# Patient Record
Sex: MALE | Race: BLACK OR AFRICAN AMERICAN | Employment: FULL TIME | URBAN - METROPOLITAN AREA
[De-identification: names, ages, dates, MRNs, and addresses within clinical notes are randomized per-mention and may not be internally consistent; named-entity substitution may affect disease eponyms.]

---

## 2017-01-04 ENCOUNTER — OP HISTORICAL/CONVERTED ENCOUNTER (OUTPATIENT)
Dept: OTHER | Age: 57
End: 2017-01-04

## 2017-02-01 ENCOUNTER — OP HISTORICAL/CONVERTED ENCOUNTER (OUTPATIENT)
Dept: OTHER | Age: 57
End: 2017-02-01

## 2017-03-01 ENCOUNTER — OP HISTORICAL/CONVERTED ENCOUNTER (OUTPATIENT)
Dept: OTHER | Age: 57
End: 2017-03-01

## 2017-04-18 ENCOUNTER — IP HISTORICAL/CONVERTED ENCOUNTER (OUTPATIENT)
Dept: OTHER | Age: 57
End: 2017-04-18

## 2017-07-30 ENCOUNTER — APPOINTMENT (OUTPATIENT)
Dept: GENERAL RADIOLOGY | Age: 57
End: 2017-07-30
Attending: EMERGENCY MEDICINE
Payer: OTHER GOVERNMENT

## 2017-07-30 ENCOUNTER — HOSPITAL ENCOUNTER (OUTPATIENT)
Age: 57
Setting detail: OBSERVATION
Discharge: HOME | End: 2017-08-01
Attending: EMERGENCY MEDICINE | Admitting: INTERNAL MEDICINE
Payer: OTHER GOVERNMENT

## 2017-07-30 DIAGNOSIS — I10 ESSENTIAL HYPERTENSION: ICD-10-CM

## 2017-07-30 DIAGNOSIS — R94.31 ABNORMAL EKG: ICD-10-CM

## 2017-07-30 DIAGNOSIS — I20.0 UNSTABLE ANGINA (HCC): Primary | ICD-10-CM

## 2017-07-30 PROBLEM — N18.30 CKD (CHRONIC KIDNEY DISEASE) STAGE 3, GFR 30-59 ML/MIN (HCC): Status: ACTIVE | Noted: 2017-07-30

## 2017-07-30 PROBLEM — F43.10 PTSD (POST-TRAUMATIC STRESS DISORDER): Status: ACTIVE | Noted: 2017-07-30

## 2017-07-30 PROBLEM — R07.9 CHEST PAIN: Status: ACTIVE | Noted: 2017-07-30

## 2017-07-30 PROBLEM — E87.6 HYPOKALEMIA: Status: ACTIVE | Noted: 2017-07-30

## 2017-07-30 PROBLEM — G47.33 OSA (OBSTRUCTIVE SLEEP APNEA): Status: ACTIVE | Noted: 2017-07-30

## 2017-07-30 LAB
ALBUMIN SERPL BCP-MCNC: 3.5 G/DL (ref 3.5–5)
ALBUMIN/GLOB SERPL: 1 {RATIO} (ref 1.1–2.2)
ALP SERPL-CCNC: 127 U/L (ref 45–117)
ALT SERPL-CCNC: 53 U/L (ref 12–78)
ANION GAP BLD CALC-SCNC: 7 MMOL/L (ref 5–15)
AST SERPL W P-5'-P-CCNC: 30 U/L (ref 15–37)
BASOPHILS # BLD AUTO: 0 K/UL (ref 0–0.1)
BASOPHILS # BLD: 0 % (ref 0–1)
BILIRUB SERPL-MCNC: 0.2 MG/DL (ref 0.2–1)
BUN SERPL-MCNC: 20 MG/DL (ref 6–20)
BUN/CREAT SERPL: 13 (ref 12–20)
CALCIUM SERPL-MCNC: 8.6 MG/DL (ref 8.5–10.1)
CHLORIDE SERPL-SCNC: 103 MMOL/L (ref 97–108)
CK MB CFR SERPL CALC: NORMAL % (ref 0–2.5)
CK MB SERPL-MCNC: <1 NG/ML (ref 5–25)
CK SERPL-CCNC: 103 U/L (ref 39–308)
CO2 SERPL-SCNC: 32 MMOL/L (ref 21–32)
CREAT SERPL-MCNC: 1.5 MG/DL (ref 0.7–1.3)
EOSINOPHIL # BLD: 0.1 K/UL (ref 0–0.4)
EOSINOPHIL NFR BLD: 2 % (ref 0–7)
ERYTHROCYTE [DISTWIDTH] IN BLOOD BY AUTOMATED COUNT: 13.2 % (ref 11.5–14.5)
GLOBULIN SER CALC-MCNC: 3.6 G/DL (ref 2–4)
GLUCOSE SERPL-MCNC: 113 MG/DL (ref 65–100)
HCT VFR BLD AUTO: 36.9 % (ref 36.6–50.3)
HGB BLD-MCNC: 12.7 G/DL (ref 12.1–17)
LYMPHOCYTES # BLD AUTO: 32 % (ref 12–49)
LYMPHOCYTES # BLD: 1.1 K/UL (ref 0.8–3.5)
MCH RBC QN AUTO: 29.7 PG (ref 26–34)
MCHC RBC AUTO-ENTMCNC: 34.4 G/DL (ref 30–36.5)
MCV RBC AUTO: 86.4 FL (ref 80–99)
MONOCYTES # BLD: 0.2 K/UL (ref 0–1)
MONOCYTES NFR BLD AUTO: 7 % (ref 5–13)
NEUTS SEG # BLD: 2 K/UL (ref 1.8–8)
NEUTS SEG NFR BLD AUTO: 59 % (ref 32–75)
PLATELET # BLD AUTO: 261 K/UL (ref 150–400)
POTASSIUM SERPL-SCNC: 3.1 MMOL/L (ref 3.5–5.1)
PROT SERPL-MCNC: 7.1 G/DL (ref 6.4–8.2)
RBC # BLD AUTO: 4.27 M/UL (ref 4.1–5.7)
SODIUM SERPL-SCNC: 142 MMOL/L (ref 136–145)
TROPONIN I SERPL-MCNC: <0.04 NG/ML
TROPONIN I SERPL-MCNC: <0.04 NG/ML
WBC # BLD AUTO: 3.4 K/UL (ref 4.1–11.1)

## 2017-07-30 PROCEDURE — 96374 THER/PROPH/DIAG INJ IV PUSH: CPT

## 2017-07-30 PROCEDURE — 84484 ASSAY OF TROPONIN QUANT: CPT | Performed by: EMERGENCY MEDICINE

## 2017-07-30 PROCEDURE — 93005 ELECTROCARDIOGRAM TRACING: CPT

## 2017-07-30 PROCEDURE — 71020 XR CHEST PA LAT: CPT

## 2017-07-30 PROCEDURE — 80053 COMPREHEN METABOLIC PANEL: CPT | Performed by: EMERGENCY MEDICINE

## 2017-07-30 PROCEDURE — 74011250637 HC RX REV CODE- 250/637: Performed by: INTERNAL MEDICINE

## 2017-07-30 PROCEDURE — 82553 CREATINE MB FRACTION: CPT | Performed by: INTERNAL MEDICINE

## 2017-07-30 PROCEDURE — 74011250637 HC RX REV CODE- 250/637: Performed by: EMERGENCY MEDICINE

## 2017-07-30 PROCEDURE — 36415 COLL VENOUS BLD VENIPUNCTURE: CPT | Performed by: INTERNAL MEDICINE

## 2017-07-30 PROCEDURE — 74011250636 HC RX REV CODE- 250/636: Performed by: INTERNAL MEDICINE

## 2017-07-30 PROCEDURE — 85025 COMPLETE CBC W/AUTO DIFF WBC: CPT | Performed by: EMERGENCY MEDICINE

## 2017-07-30 PROCEDURE — 99218 HC RM OBSERVATION: CPT

## 2017-07-30 PROCEDURE — 99285 EMERGENCY DEPT VISIT HI MDM: CPT

## 2017-07-30 PROCEDURE — 96372 THER/PROPH/DIAG INJ SC/IM: CPT

## 2017-07-30 RX ORDER — ASPIRIN 325 MG
325 TABLET ORAL DAILY
Status: DISCONTINUED | OUTPATIENT
Start: 2017-07-31 | End: 2017-07-31

## 2017-07-30 RX ORDER — TRAZODONE HYDROCHLORIDE 50 MG/1
50 TABLET ORAL
Status: DISCONTINUED | OUTPATIENT
Start: 2017-07-30 | End: 2017-08-01 | Stop reason: HOSPADM

## 2017-07-30 RX ORDER — LISINOPRIL AND HYDROCHLOROTHIAZIDE 12.5; 2 MG/1; MG/1
1 TABLET ORAL
COMMUNITY
End: 2017-08-01

## 2017-07-30 RX ORDER — CLONAZEPAM 0.5 MG/1
0.5 TABLET ORAL
Status: DISCONTINUED | OUTPATIENT
Start: 2017-07-30 | End: 2017-08-01 | Stop reason: HOSPADM

## 2017-07-30 RX ORDER — NITROGLYCERIN 0.4 MG/1
0.4 TABLET SUBLINGUAL
Status: COMPLETED | OUTPATIENT
Start: 2017-07-30 | End: 2017-07-30

## 2017-07-30 RX ORDER — METOPROLOL SUCCINATE 50 MG/1
50 TABLET, EXTENDED RELEASE ORAL DAILY
Status: DISCONTINUED | OUTPATIENT
Start: 2017-07-31 | End: 2017-07-31

## 2017-07-30 RX ORDER — AMLODIPINE BESYLATE 5 MG/1
5 TABLET ORAL DAILY
Status: DISCONTINUED | OUTPATIENT
Start: 2017-07-31 | End: 2017-08-01 | Stop reason: HOSPADM

## 2017-07-30 RX ORDER — POTASSIUM CHLORIDE 750 MG/1
40 TABLET, FILM COATED, EXTENDED RELEASE ORAL
Status: COMPLETED | OUTPATIENT
Start: 2017-07-30 | End: 2017-07-30

## 2017-07-30 RX ORDER — METOPROLOL SUCCINATE 50 MG/1
50 TABLET, EXTENDED RELEASE ORAL DAILY
Status: ON HOLD | COMMUNITY
End: 2017-08-01

## 2017-07-30 RX ORDER — CLONAZEPAM 2 MG/1
2 TABLET ORAL DAILY
COMMUNITY

## 2017-07-30 RX ORDER — FLUOXETINE HYDROCHLORIDE 40 MG/1
40 CAPSULE ORAL DAILY
COMMUNITY
End: 2019-01-14

## 2017-07-30 RX ORDER — TEMAZEPAM 15 MG/1
15 CAPSULE ORAL
Status: DISCONTINUED | OUTPATIENT
Start: 2017-07-30 | End: 2017-08-01 | Stop reason: HOSPADM

## 2017-07-30 RX ORDER — FLUOXETINE HYDROCHLORIDE 20 MG/1
40 CAPSULE ORAL DAILY
Status: DISCONTINUED | OUTPATIENT
Start: 2017-07-31 | End: 2017-08-01 | Stop reason: HOSPADM

## 2017-07-30 RX ORDER — SODIUM CHLORIDE 0.9 % (FLUSH) 0.9 %
5-10 SYRINGE (ML) INJECTION EVERY 8 HOURS
Status: DISCONTINUED | OUTPATIENT
Start: 2017-07-30 | End: 2017-08-01 | Stop reason: HOSPADM

## 2017-07-30 RX ORDER — MIRTAZAPINE 15 MG/1
15 TABLET, FILM COATED ORAL
Status: DISCONTINUED | OUTPATIENT
Start: 2017-07-30 | End: 2017-08-01 | Stop reason: HOSPADM

## 2017-07-30 RX ORDER — GUAIFENESIN 100 MG/5ML
324 LIQUID (ML) ORAL
Status: COMPLETED | OUTPATIENT
Start: 2017-07-30 | End: 2017-07-30

## 2017-07-30 RX ORDER — BUPROPION HYDROCHLORIDE 150 MG/1
150 TABLET, EXTENDED RELEASE ORAL 2 TIMES DAILY
Status: DISCONTINUED | OUTPATIENT
Start: 2017-07-30 | End: 2017-08-01 | Stop reason: HOSPADM

## 2017-07-30 RX ORDER — HEPARIN SODIUM 5000 [USP'U]/ML
5000 INJECTION, SOLUTION INTRAVENOUS; SUBCUTANEOUS EVERY 8 HOURS
Status: DISCONTINUED | OUTPATIENT
Start: 2017-07-30 | End: 2017-08-01 | Stop reason: HOSPADM

## 2017-07-30 RX ORDER — AMLODIPINE BESYLATE 10 MG/1
10 TABLET ORAL DAILY
COMMUNITY
End: 2017-07-30

## 2017-07-30 RX ORDER — CLONAZEPAM 1 MG/1
2 TABLET ORAL DAILY
Status: DISCONTINUED | OUTPATIENT
Start: 2017-07-31 | End: 2017-08-01 | Stop reason: HOSPADM

## 2017-07-30 RX ORDER — BUPROPION HYDROCHLORIDE 150 MG/1
100 TABLET, EXTENDED RELEASE ORAL DAILY
COMMUNITY

## 2017-07-30 RX ORDER — LISINOPRIL AND HYDROCHLOROTHIAZIDE 10; 12.5 MG/1; MG/1
1 TABLET ORAL
COMMUNITY
End: 2017-07-30

## 2017-07-30 RX ORDER — METOPROLOL SUCCINATE 100 MG/1
TABLET, EXTENDED RELEASE ORAL DAILY
COMMUNITY
End: 2017-07-30

## 2017-07-30 RX ORDER — SODIUM CHLORIDE 0.9 % (FLUSH) 0.9 %
5-10 SYRINGE (ML) INJECTION AS NEEDED
Status: DISCONTINUED | OUTPATIENT
Start: 2017-07-30 | End: 2017-08-01 | Stop reason: HOSPADM

## 2017-07-30 RX ORDER — ASPIRIN 325 MG
325 TABLET ORAL DAILY
Status: DISCONTINUED | OUTPATIENT
Start: 2017-07-31 | End: 2017-07-30

## 2017-07-30 RX ADMIN — Medication 10 ML: at 22:21

## 2017-07-30 RX ADMIN — CLONAZEPAM 0.5 MG: 0.5 TABLET ORAL at 22:21

## 2017-07-30 RX ADMIN — ASPIRIN 81 MG 324 MG: 81 TABLET ORAL at 14:29

## 2017-07-30 RX ADMIN — HEPARIN SODIUM 5000 UNITS: 5000 INJECTION, SOLUTION INTRAVENOUS; SUBCUTANEOUS at 18:21

## 2017-07-30 RX ADMIN — MIRTAZAPINE 15 MG: 15 TABLET, FILM COATED ORAL at 22:21

## 2017-07-30 RX ADMIN — NITROGLYCERIN 0.4 MG: 0.4 TABLET SUBLINGUAL at 14:34

## 2017-07-30 RX ADMIN — Medication 10 ML: at 18:21

## 2017-07-30 RX ADMIN — POTASSIUM CHLORIDE 40 MEQ: 750 TABLET, FILM COATED, EXTENDED RELEASE ORAL at 15:54

## 2017-07-30 RX ADMIN — TRAZODONE HYDROCHLORIDE 50 MG: 50 TABLET ORAL at 22:21

## 2017-07-30 RX ADMIN — BUPROPION HYDROCHLORIDE 150 MG: 150 TABLET, EXTENDED RELEASE ORAL at 18:21

## 2017-07-30 NOTE — ED NOTES
TRANSFER - OUT REPORT:    Verbal report given to Halie(name) on [employer]  being transferred to [facility](unit) for routine progression of care       Report consisted of patients Situation, Background, Assessment and   Recommendations(SBAR). Information from the following report(s) SBAR, ED Summary, MAR, Recent Results and Cardiac Rhythm NSR was reviewed with the receiving nurse. Lines:   Peripheral IV 07/30/17 Left Wrist (Active)   Site Assessment Clean, dry, & intact 7/30/2017  2:31 PM   Phlebitis Assessment 0 7/30/2017  2:31 PM   Infiltration Assessment 0 7/30/2017  2:31 PM   Dressing Status Clean, dry, & intact 7/30/2017  2:31 PM   Dressing Type Transparent 7/30/2017  2:31 PM   Hub Color/Line Status Pink;Patent; Flushed 7/30/2017  2:31 PM        Opportunity for questions and clarification was provided.       Patient transported with:   Monitor  Tech

## 2017-07-30 NOTE — IP AVS SNAPSHOT
Darren Dasilva 
 
 
 [address] [facility] 
[phone] Patient: Ar Garrett MRN: [id number] GRK:[id number] You are allergic to the following No active allergies Recent Documentation Height Weight BMI Smoking Status 1.676 m 74.8 kg 26.63 kg/m2 Never Smoker Emergency Contacts Name Discharge Info Relation Home Work Mobile [address] CAREGIVER [3] Spouse [3] [phone] About your hospitalization You were admitted on:  July 30, 2017 You last received care in the:  [facility] 3 PROG CARE TELE 2 You were discharged on:  August 1, 2017 Unit phone number:  [phone] Why you were hospitalized Your primary diagnosis was:  Not on File Your diagnoses also included:  Chest Pain, Ckd (Chronic Kidney Disease) Stage 3, Gfr 30-59 Ml/Min, Edgar (Obstructive Sleep Apnea), Htn (Hypertension), Prostate Cancer (Hcc), Ptsd (Post-Traumatic Stress Disorder), Hypokalemia Providers Seen During Your Hospitalizations Provider Role Specialty Primary office phone Judah Ortiz MD Attending Provider Emergency Medicine [phone] Toyin Rainey MD Attending Provider Hospitalist [phone] Jean Jeffrey MD Attending Provider Internal Medicine [phone] Damian Soto MD Attending Provider Internal Medicine [phone] Your Primary Care Physician (PCP) Primary Care Physician Office Phone Office Fax OTHER, PHYS ** None ** ** None ** Follow-up Information Follow up With Details Comments Contact Info Sarita Nieves MD In 1 week  [address] [facility] [place] [address] [facility] [address] 
[phone] Follow up with your primary care doctor in 1 week Phys MD Tammy   Patient can only remember the practice name and not the physician Current Discharge Medication List  
  
START taking these medications Dose & Instructions Dispensing Information Comments Morning Noon Evening Bedtime  
 amLODIPine 5 mg tablet Commonly known as:  Paige Grebe Your last dose was:  8/1 9:05 am  
Your next dose is:  8/2 9:00 am  
   
 Dose:  5 mg Take 1 Tab by mouth daily. Quantity:  30 Tab Refills:  0  
     
   
   
   
  
 aspirin 81 mg chewable tablet Your last dose was:  8/1  9:00 am  
Your next dose is:  8/2  9:05 am  
   
 Dose:  81 mg Take 1 Tab by mouth daily. Quantity:  30 Tab Refills:  0  
     
   
   
   
  
 atorvastatin 20 mg tablet Commonly known as:  LIPITOR Your last dose was:  7/31  9:30 pm  
Your next dose is:  8/1  9:30 pm  
   
 Dose:  20 mg Take 1 Tab by mouth nightly. Quantity:  30 Tab Refills:  0  
     
   
   
   
  
 cloNIDine HCl 0.1 mg tablet Commonly known as:  CATAPRES Notes to Patient:  AS NEEDED FOR HIGH BLOOD PRESSURE Dose:  0.1 mg Take 1 Tab by mouth as needed (as needed for BP>170/100). Quantity:  30 Tab Refills:  0  
     
   
   
   
  
 lisinopril 20 mg tablet Commonly known as:  Valene Pencil Your last dose was:  8/1  9:05 am  
Your next dose is:  8/2  9:00 am  
   
 Dose:  20 mg Take 1 Tab by mouth daily. Quantity:  30 Tab Refills:  0 CONTINUE these medications which have CHANGED Dose & Instructions Dispensing Information Comments Morning Noon Evening Bedtime  
 metoprolol succinate 100 mg tablet Commonly known as:  TOPROL-XL What changed:   
- medication strength 
- how much to take Your last dose was:  8/1  9:00 am  
Your next dose is:  8/2  9:00 am  
   
 Dose:  100 mg Take 1 Tab by mouth daily. Quantity:  30 Tab Refills:  0 CONTINUE these medications which have NOT CHANGED Dose & Instructions Dispensing Information Comments Morning Noon Evening Bedtime buPROPion  mg SR tablet Commonly known as: WELLBUTRIN SR Your last dose was:  8/1  9:00 am  
Your next dose is:  8/1  6:00 pm  
   
 Dose:  150 mg Take 150 mg by mouth two (2) times a day. Refills:  0  
     
   
   
   
  
 * clonazePAM 0.5 mg tablet Commonly known as:  Niki Flynna Your last dose was:  7/31  9:30 pm  
Your next dose is:  8/1  9:30 pm  
   
 Dose:  0.5 mg Take 0.5 mg by mouth nightly. Refills:  0  
     
   
   
   
  
 * clonazePAM 2 mg tablet Commonly known as:  Niki Flynna Your last dose was:  8/1 9:00 am  
Your next dose is:  8/2  9:00 am  
   
 Dose:  2 mg Take 2 mg by mouth daily. Refills:  0  
     
   
   
   
  
 mirtazapine 15 mg tablet Commonly known as:  Ryan Font Notes to Patient:  AS NEEDED Dose:  15 mg Take 15 mg by mouth nightly as needed. Refills:  0 PROzac 40 mg capsule Generic drug:  FLUoxetine Your last dose was:  8/1  9:05 am  
Your next dose is:  8/2  9:00 am  
   
 Dose:  40 mg Take 40 mg by mouth daily. Refills:  0  
     
   
   
   
  
 temazepam 15 mg capsule Commonly known as:  RESTORIL Notes to Patient:  AS NEEDED Dose:  15 mg Take 15 mg by mouth nightly as needed for Sleep. Refills:  0  
     
   
   
   
  
 traZODone 50 mg tablet Commonly known as:  Jessica Odor Your last dose was:  7/31  9:30 pm  
Your next dose is:  8/1  9:30 pm  
   
 Dose:  50 mg Take 50 mg by mouth nightly. Refills:  0  
     
   
   
   
  
 * Notice: This list has 2 medication(s) that are the same as other medications prescribed for you. Read the directions carefully, and ask your doctor or other care provider to review them with you. STOP taking these medications   
 lisinopril-hydroCHLOROthiazide 20-12.5 mg per tablet Commonly known as:  Weston Mendez Where to Get Your Medications Information on where to get these meds will be given to you by the nurse or doctor. !  Ask your nurse or doctor about these medications  
  amLODIPine 5 mg tablet  
 aspirin 81 mg chewable tablet  
 atorvastatin 20 mg tablet  
 cloNIDine HCl 0.1 mg tablet  
 lisinopril 20 mg tablet  
 metoprolol succinate 100 mg tablet Discharge Instructions HOSPITALIST DISCHARGE INSTRUCTIONS 
NAME: Griffin MARIN:  1960 MRN:  [id number] Date/Time:  2017 7:20 AM 
 
ADMIT DATE: 2017 DISCHARGE DATE: 2017 PRINCIPAL DISCHARGE DIAGNOSES: 
High blood pressure Chest pain MEDICATIONS: 
· It is important that you take the medication exactly as they are prescribed. Note the changes and additions to your medications. Be sure you understand these changes before you are discharged today. · Keep your medication in the bottles provided by the pharmacist and keep a list of the medication names, dosages, and times to be taken in your wallet. · Do not take other medications without consulting your doctor. Pain Management: per above medications What to do at [facility] Recommended diet:  Cardiac Diet Recommended activity: Activity as tolerated If you experience any of the following symptoms then please call your primary care physician or return to the emergency room if you cannot get hold of your doctor: 
severe chest pain, shortness of breath, dizziness, headache or other severe concerning symptoms that brought you to the hospital in the first place Follow Up: Follow-up Information Follow up With Details Comments Contact Info Brisa Harris MD In 1 week  [address] - [facility] Cardiology [facility] [address] 
[phone] Follow up with your primary care doctor in 1 week Information obtained by : 
I understand that if any problems occur once I am at home I am to contact my physician. I understand and acknowledge receipt of the instructions indicated above. Physician's or R.N.'s Signature                                                                  Date/Time Patient or Representative Signature                                                          Date/Time 
 
  
Acute High Blood Pressure: Care Instructions Your Care Instructions Acute high blood pressure is very high blood pressure. It's a serious problem. Very high blood pressure can damage your brain, heart, eyes, and kidneys. You may have been given medicines to lower your blood pressure. You may have gotten them as pills or through a needle in one of your veins. This is called an IV. And maybe you were given other medicines too. These can be needed when high blood pressure causes other problems. To keep your blood pressure at a lower level, you may need to make healthy lifestyle changes. And you will probably need to take medicines. Be sure to follow up with your doctor about your blood pressure and what you can do about it. Follow-up care is a key part of your treatment and safety. Be sure to make and go to all appointments, and call your doctor if you are having problems. It's also a good idea to know your test results and keep a list of the medicines you take. How can you care for yourself at home? · See your doctor as often as he or she recommends. This is to make sure your blood pressure is under control. You will probably go at least 2 times a year. But it may be more often at first. 
· Take your blood pressure medicine exactly as prescribed. You may take one or more types. They include diuretics, beta-blockers, ACE inhibitors, calcium channel blockers, and angiotensin II receptor blockers. Call your doctor if you think you are having a problem with your medicine.  
· If you take blood pressure medicine, talk to your doctor before you take decongestants or anti-inflammatory medicine, such as ibuprofen. These can raise blood pressure. · Learn how to check your blood pressure at home. Check it often. · Ask your doctor if it's okay to drink alcohol. · Talk to your doctor about lifestyle changes that can help blood pressure. These include being active and not smoking. When should you call for help? Call 911 anytime you think you may need emergency care. This may mean having symptoms that suggest that your blood pressure is causing a serious heart or blood vessel problem. Your blood pressure may be over 180/110. For example, call 911 if: 
· You have symptoms of a heart attack. These may include: ¨ Chest pain or pressure, or a strange feeling in the chest. 
¨ Sweating. ¨ Shortness of breath. ¨ Nausea or vomiting. ¨ Pain, pressure, or a strange feeling in the back, neck, jaw, or upper belly or in one or both shoulders or arms. ¨ Lightheadedness or sudden weakness. ¨ A fast or irregular heartbeat. · You have symptoms of a stroke. These may include: 
¨ Sudden numbness, tingling, weakness, or loss of movement in your face, arm, or leg, especially on only one side of your body. ¨ Sudden vision changes. ¨ Sudden trouble speaking. ¨ Sudden confusion or trouble understanding simple statements. ¨ Sudden problems with walking or balance. ¨ A sudden, severe headache that is different from past headaches. · You have severe back or belly pain. Do not wait until your blood pressure comes down on its own. Get help right away. Call your doctor now or seek immediate care if: 
· Your blood pressure is much higher than normal (such as 180/110 or higher), but you don't have symptoms. · You think high blood pressure is causing symptoms, such as: ¨ Severe headache. ¨ Blurry vision.  
Watch closely for changes in your health, and be sure to contact your doctor if: 
· Your blood pressure measures 140/90 or higher at least 2 times. That means the top number is 140 or higher or the bottom number is 90 or higher, or both. · You think you may be having side effects from your blood pressure medicine. · Your blood pressure is usually normal, but it goes above normal at least 2 times. Where can you learn more? Go to http://jailene-paula.info/. Enter O141 in the search box to learn more about \"Acute High Blood Pressure: Care Instructions. \" Current as of: August 8, 2016 Content Version: 11.3 © 1556-6823 [employer]. Care instructions adapted under license by [employer] (which disclaims liability or warranty for this information). If you have questions about a medical condition or this instruction, always ask your healthcare professional. [place] [address] any warranty or liability for your use of this information. Discharge Instructions Attachments/References MEFS - AMLODIPINE (HYPERTENIPINE-2.5, NORVASC) - (BY MOUTH) (ENGLISH) AMLODIPINE (BY MOUTH) (ENGLISH) ASPIRIN (BY MOUTH) (ENGLISH) ASPIRIN: HEART ATTACK AND STROKE PREVENTION (ENGLISH) MEFS - ATORVASTATIN (LIPITOR) - (BY MOUTH) (ENGLISH) ATORVASTATIN (BY MOUTH) (ENGLISH) MEFS - CLONIDINE (CATAPRES, KAPVAY, KAPVAY DOSE PACK) - (BY MOUTH) (ENGLISH) CLONIDINE (BY MOUTH) (ENGLISH) MEFS - LISINOPRIL (PRINIVIL, ZESTRIL) - (BY MOUTH) (ENGLISH) LISINOPRIL (BY MOUTH) (ENGLISH) Discharge Orders None [facility] Announcement We are excited to announce that we are making your provider's discharge notes available to you in Deemelo. You will see these notes when they are completed and signed by the physician that discharged you from your recent hospital stay. If you have any questions or concerns about any information you see in Deemelo, please call the Health Information Department where you were seen or reach out to your Primary Care Provider for more information about your plan of care. Introducing [facility]! [place] introduces QuVIS patient portal. Now you can access parts of your medical record, email your doctor's office, and request medication refills online. 1. In your internet browser, go to [email or web address] 2. Click on the First Time User? Click Here link in the Sign In box. You will see the New Member Sign Up page. 3. Enter your QuVIS Access Code exactly as it appears below. You will not need to use this code after youve completed the sign-up process. If you do not sign up before the expiration date, you must request a new code. · QuVIS Access Code: [email or web address] Expires: 10/28/2017  2:51 PM 
 
4. Enter the last four digits of your Social Security Number (xxxx) and Date of Birth (mm/dd/yyyy) as indicated and click Submit. You will be taken to the next sign-up page. 5. Create a QuVIS ID. This will be your QuVIS login ID and cannot be changed, so think of one that is secure and easy to remember. 6. Create a QuVIS password. You can change your password at any time. 7. Enter your Password Reset Question and Answer. This can be used at a later time if you forget your password. 8. Enter your e-mail address. You will receive e-mail notification when new information is available in [address] 9. Click Sign Up. You can now view and download portions of your medical record. 10. Click the Download Summary menu link to download a portable copy of your medical information. If you have questions, please visit the Frequently Asked Questions section of the QuVIS website. Remember, QuVIS is NOT to be used for urgent needs. For medical emergencies, dial 911. Now available from your iPhone and Android! General Information Please provide this summary of care documentation to your next provider. Patient Signature:  ____________________________________________________________  Date: ____________________________________________________________  
  
Celine Ramana Provider Signature:  ____________________________________________________________ Date:  ____________________________________________________________ More Information Amlodipine (Hypertenipine-2.5, Norvasc) - (By mouth) Why this medicine is used:  
Treats high blood pressure and angina (chest pain). Contact a nurse or doctor right away if you have: · Blistering, peeling, red skin rash · Chest pain that may spread to your arms, jaw, back, or neck · Trouble breathing · Swelling in your hands, ankles, legs · Unusual sweating, faintness Common side effects: · Tiredness, drowsiness · Headache © 2017 [facility] Information is for End User's use only and may not be sold, redistributed or otherwise used for commercial purposes. Amlodipine (By mouth) Amlodipine ([id number]) Treats high blood pressure and angina (chest pain). This medicine is a calcium channel blocker. Brand Name(s): Norvasc There may be other brand names for this medicine. When This Medicine Should Not Be Used: This medicine is not right for everyone. Do not use it if you had an allergic reaction to amlodipine. How to Use This Medicine:  
Tablet, Dissolving Tablet · Take your medicine as directed. Your dose may need to be changed several times to find what works best for you. Take this medicine at the same time each day. · Read and follow the patient instructions that come with this medicine. Talk to your doctor or pharmacist if you have any questions. · Missed dose: Take a dose as soon as you remember. If it has been more than 12 hours since you were supposed to take your dose, skip the missed dose and take your next regular dose at the regular time. · Store the medicine in a closed container at room temperature, away from heat, moisture, and direct light. Drugs and Foods to Avoid: Ask your doctor or pharmacist before using any other medicine, including over-the-counter medicines, vitamins, and herbal products. · Some medicines can affect how amlodipine works. Tell your doctor if you are also using any of the following: ¨ Clarithromycin, cyclosporine, diltiazem, itraconazole, ritonavir, sildenafil, simvastatin, tacrolimus Warnings While Using This Medicine: · Tell your doctor if you are pregnant or breastfeeding, or if you have liver disease, heart disease, coronary artery disease, or aortic stenosis. · This medicine could lower your blood pressure too much, especially when you first use it or if you are dehydrated. Stand or sit up slowly if you feel lightheaded or dizzy. · Your doctor will check your progress and the effects of this medicine at regular visits. Keep all appointments. · Do not stop using this medicine without asking your doctor, even if you feel well. This medicine will not cure high blood pressure, but it will help keep it in normal range. You may have to take blood pressure medicine for the rest of your life. · Keep all medicine out of the reach of children. Never share your medicine with anyone. Possible Side Effects While Using This Medicine:  
Call your doctor right away if you notice any of these side effects: · Allergic reaction: Itching or hives, swelling in your face or hands, swelling or tingling in your mouth or throat, chest tightness, trouble breathing · Lightheadedness, dizziness · New or worsening chest pain · Swelling in your hands, ankles, or legs · Trouble breathing, nausea, unusual sweating, fainting If you notice other side effects that you think are caused by this medicine, tell your doctor. Call your doctor for medical advice about side effects. You may report side effects to FDA at 0-431-FDA-4983 © 2017 [facility] Information is for End User's use only and may not be sold, redistributed or otherwise used for commercial purposes.  
The above information is an  only. It is not intended as medical advice for individual conditions or treatments. Talk to your doctor, nurse or pharmacist before following any medical regimen to see if it is safe and effective for you. Aspirin (By mouth) Aspirin (AS-pir-in) Treats pain, fever, and inflammation. May lower risk of heart attack and stroke. Brand Name(s): Ascriptin Regular Strength, Aspergum, Aspir Low, Aspirin Adult Low Dose, Jerome Aspirin Children's, Jerome Aspirin Regimen, Jerome Extra Strength, Jerome Genuine Aspirin, Bufferin, Bufferin Low Dose, Durlaza, Ecotrin, Ecpirin, Enteric Aspirin, Genuine Aspirin There may be other brand names for this medicine. When This Medicine Should Not Be Used: This medicine is not right for everyone. Do not use it if you had an allergic reaction to aspirin or other NSAIDs, or if you have a history of asthma with nasal polyps and rhinitis. How to Use This Medicine:  
Delayed Release Capsule, Long Acting Capsule, Gum, Tablet, Chewable Tablet, Fizzy Tablet, Coated Tablet, Long Acting Tablet, 24 Hour Capsule · Your doctor will tell you how much medicine to use. Do not use more than directed. · It is best to take this medicine with food or milk. · Capsule, tablet, or coated tablet: Swallow whole. Do not crush, break, or chew it. · Chewable tablet: You may chew it completely or swallow it whole. · Gum: Chew completely to make sure you get as much medicine as possible. Drink a full glass (8 ounces) of water after chewing the gum. · Swallow the extended-release capsule whole. Do not crush, break, or chew it. Take the capsule with a full glass of water at the same time each day. · Follow the instructions on the medicine label if you are using this medicine without a prescription. · Missed dose: If you miss a dose of Durlaza, skip the missed dose and go back to your regular dosing schedule.  Do not take extra medicine to make up for a missed dose. 
· Store the medicine in a closed container at room temperature, away from heat, moisture, and direct light. Drugs and Foods to Avoid: Ask your doctor or pharmacist before using any other medicine, including over-the-counter medicines, vitamins, and herbal products. · Some foods and medicines can affect how aspirin works. Tell your doctor if you are using any of the following: ¨ Dipyridamole, methotrexate, probenecid, sulfinpyrazone, ticlopidine ¨ Blood thinner (including clopidogrel, prasugrel, ticagrelor, warfarin) ¨ Blood pressure medicine ¨ Medicine to treat seizures (including phenytoin, valproic acid) ¨ NSAID pain or arthritis medicine (including celecoxib, diclofenac, ibuprofen, naproxen) ¨ Steroid medicine (including dexamethasone, hydrocortisone, methylprednisolone, prednisolone, prednisone) · Do not take Durlaza 2 hours before or 1 hour after you drink alcohol or take medicines that contain alcohol. Warnings While Using This Medicine: · Tell your doctor if you are pregnant or breastfeeding. Do not use this medicine during the later part of a pregnancy unless your doctor tells you to. · Tell your doctor if you have kidney disease, liver disease, high blood pressure, heart disease, or a history of stomach bleeding or ulcers. · This medicine may increase your risk for bleeding, including stomach ulcers. · Do not give aspirin to a child or teenager who has chickenpox or flu symptoms, unless the doctor says it is okay. Aspirin can cause a life-threatening reaction called Reye syndrome. · Tell any doctor or dentist who treats you that you are using this medicine. This medicine may affect certain medical test results. · Keep all medicine out of the reach of children. Never share your medicine with anyone. Possible Side Effects While Using This Medicine:  
Call your doctor right away if you notice any of these side effects: · Allergic reaction: Itching or hives, swelling in your face or hands, swelling or tingling in your mouth or throat, chest tightness, trouble breathing · Bloody or black stools, bloody vomit or vomit that looks like coffee grounds · Chest tightness, wheezing · Ringing in the ears · Severe stomach pain · Unusual bleeding, bruising, or weakness If you notice other side effects that you think are caused by this medicine, tell your doctor. Call your doctor for medical advice about side effects. You may report side effects to FDA at 3-682-CYB-8176 © 2017 [address] Information is for End User's use only and may not be sold, redistributed or otherwise used for commercial purposes. The above information is an  only. It is not intended as medical advice for individual conditions or treatments. Talk to your doctor, nurse or pharmacist before following any medical regimen to see if it is safe and effective for you. Taking Aspirin to Prevent Heart Attack and Stroke: Care Instructions Your Care Instructions Aspirin acts as a \"blood thinner. \" It prevents blood clots from forming. When taken during and after a heart attack, it can reduce your chance of dying. And it's used if you have a stent in your coronary artery. Also, aspirin helps certain people lower their risk of a heart attack or stroke. Be sure you know what dose of aspirin to take and how often to take it. Low-dose aspirin is typically 81 mg. But the dose for daily aspirin can range from 81 mg to 325 mg. Taking aspirin every day can cause bleeding. It may not be safe if you have stomach ulcers. And it may not be safe if you have high blood pressure that is not controlled. If you take aspirin pills every day, do not take ones that have other ingredients such as caffeine or sodium. Before you start to take aspirin, tell your doctor all the medicines, vitamins, herbal products, and supplements you take. Follow-up care is a key part of your treatment and safety.  Be sure to make and go to all appointments, and call your doctor if you are having problems. It's also a good idea to know your test results and keep a list of the medicines you take. How can you care for yourself at home? · Take aspirin with a full glass of water unless your doctor tells you not to. Do not lie down right after you take it. · If you have a stent in your coronary artery, take your aspirin as your heart doctor says to. If another doctor says to stop taking the aspirin for any reason, talk to your heart doctor before you stop. · Do not chew or crush the coated or sustained-release forms of aspirin. · Ask your doctor if you can drink alcohol while you take aspirin. And ask how much you can drink. Too much alcohol with aspirin can cause stomach bleeding. · Do not take aspirin if you are pregnant, unless your doctor says it is okay. · Keep all aspirin out of children's reach. · Throw aspirin away if it starts to smell like vinegar. · Do not take aspirin if you have gout or if you take prescription blood thinners, unless your doctor has told you to. · Do not take prescription or over-the-counter medicines, vitamins, herbal products, or supplements without talking to your doctor first. Virl Janet the label before you take another over-the-counter medicine. Many contain aspirin. So they could cause you to take too much aspirin. · Talk with your doctor before you take a pain medicine. Ask which type of medicine you can take and how to take it safely with aspirin. · Tell your doctor or dentist before a surgery or procedure that you take aspirin. He or she will tell you if you should stop taking aspirin before your surgery or procedure. Make sure that you understand exactly what your doctor wants you to do. Where can you learn more? Go to http://jailene-paula.info/. Enter U957 in the search box to learn more about \"Taking Aspirin to Prevent Heart Attack and Stroke: Care Instructions. \" Current as of: April 3, 2017 Content Version: 11.3 © 4200-9272 [employer]. Care instructions adapted under license by [employer] (which disclaims liability or warranty for this information). If you have questions about a medical condition or this instruction, always ask your healthcare professional. [address] any warranty or liability for your use of this information. Atorvastatin (Lipitor) - (By mouth) Why this medicine is used:  
Treats high cholesterol and triglyceride levels. Reduces the risk of angina, stroke, heart attack, or certain heart and blood vessel problems. Contact a nurse or doctor right away if you have: · Severe headache, confusion, trouble speaking · Dark urine or pale stools · Yellow skin or eyes · Nausea, vomiting, loss of appetite, stomach pain · Muscle pain, tenderness, or weakness; unusual tiredness Common side effects: 
· Diarrhea · Joint pain © 2017 [facility] Information is for End User's use only and may not be sold, redistributed or otherwise used for commercial purposes. Atorvastatin (By mouth) Atorvastatin (a-tor-va-STAT-in) Treats high cholesterol and triglyceride levels. Reduces the risk of angina, stroke, heart attack, or certain heart and blood vessel problems. This medicine is a statin. Brand Name(s): Lipitor There may be other brand names for this medicine. When This Medicine Should Not Be Used: This medicine is not right for everyone. Do not use it if you had an allergic reaction to atorvastatin, if you have active liver disease, or if you are pregnant or breastfeeding. How to Use This Medicine:  
Tablet · Take your medicine as directed. Your dose may need to be changed several times to find what works best for you. · Take this medicine at the same time each day. · Swallow the tablet whole. Do not break it. · Missed dose: Take a dose as soon as you remember.  If it is less than 12 hours until your next dose, skip the missed dose and take the next dose at the regular time. Do not take 2 doses of this medicine within 12 hours. · Read and follow the patient instructions that come with this medicine. Talk to your doctor or pharmacist if you have any questions. · Store the medicine in a closed container at room temperature, away from heat, moisture, and direct light. Drugs and Foods to Avoid: Ask your doctor or pharmacist before using any other medicine, including over-the-counter medicines, vitamins, and herbal products. · Some medicines can affect how atorvastatin works. Tell your doctor if you also use birth control pills, boceprevir, cimetidine, colchicine, cyclosporine, digoxin, niacin, rifampin, spironolactone, telaprevir, medicine to treat an infection, or medicine to treat HIV/AIDS. Warnings While Using This Medicine: · It is not safe to take this medicine during pregnancy. It could harm an unborn baby. Tell your doctor right away if you become pregnant. · Tell your doctor if you have kidney disease, diabetes, muscle pain or weakness, thyroid problems, have recently had a stroke or TIA (transient ischemic attack), or have a history of liver disease. Tell your doctor if you drink grapefruit juice or drink alcohol regularly. · This medicine can cause muscle problems, which can lead to kidney problems. · Tell any doctor or dentist who treats you that you use this medicine. You may need to stop using it if you have surgery, have an injury, or develop serious health problems. · Your doctor will do lab tests at regular visits to check on the effects of this medicine. Keep all appointments. · Keep all medicine out of the reach of children. Never share your medicine with anyone. Possible Side Effects While Using This Medicine:  
Call your doctor right away if you notice any of these side effects: · Allergic reaction: Itching or hives, swelling in your face or hands, swelling or tingling in your mouth or throat, chest tightness, trouble breathing · Blistering, peeling, red skin rash · Change in how much or how often you urinate · Dark urine or pale stools, nausea, vomiting, loss of appetite, stomach pain, yellow skin or eyes · Fever · Muscle pain, tenderness, or weakness · Unusual tiredness If you notice these less serious side effects, talk with your doctor: · Diarrhea · Joint pain If you notice other side effects that you think are caused by this medicine, tell your doctor. Call your doctor for medical advice about side effects. You may report side effects to FDA at 1-179-ACF-0572 © 2017 [facility] Information is for End User's use only and may not be sold, redistributed or otherwise used for commercial purposes. The above information is an  only. It is not intended as medical advice for individual conditions or treatments. Talk to your doctor, nurse or pharmacist before following any medical regimen to see if it is safe and effective for you. Clonidine (Catapres, Kapvay, Kapvay Dose Pack) - (By mouth) Why this medicine is used:  
Treats high blood pressure. Also treats ADHD. Contact a nurse or doctor right away if you have: 
· Fast, slow, pounding, or uneven heartbeat · Lightheadedness, dizziness, fainting Common side effects: · Tiredness · Constipation, stomach pain · Dry eyes, mouth, and throat · Headache © 2017 [facility] Information is for End User's use only and may not be sold, redistributed or otherwise used for commercial purposes. Clonidine (By mouth) Clonidine (Clay Rohini) Treats high blood pressure. Also treats ADHD. Brand Name(s): Alyson Neil There may be other brand names for this medicine. When This Medicine Should Not Be Used: This medicine is not right for everyone. Do not use it if you had an allergic reaction to clonidine.  
How to Use This Medicine:  
Long Acting Suspension, Tablet, Long Acting Tablet · Take your medicine as directed. Your dose may need to be changed several times to find what works best for you. · Swallow the extended-release tablet whole. Do not crush, break, or chew it. · Clonidine extended-release tablets work differently than clonidine immediate-release tablets. Do not switch from the extended-release tablets to the immediate-release tablets unless your doctor tells you to. · Measure the oral liquid medicine with a marked measuring spoon, oral syringe, or medicine cup. Shake the bottle well before each use. · Read and follow the patient instructions that come with this medicine. Talk to your doctor or pharmacist if you have any questions. · Missed dose: Take a dose as soon as you remember. If it is almost time for your next dose, wait until then and take a regular dose. Do not take extra medicine to make up for a missed dose. If you miss more than 1 dose of clonidine tablets, check with your doctor right away. · Store the medicine in a closed container at room temperature, away from heat, moisture, and direct light. Drugs and Foods to Avoid: Ask your doctor or pharmacist before using any other medicine, including over-the-counter medicines, vitamins, and herbal products. · Do not use this medicine together with other products that contain clonidine. · Some medicines can affect how clonidine works. Tell your doctor if you are taking depression medicine. · Tell your doctor if you use anything else that makes you sleepy. Some examples are allergy medicine, narcotic pain medicine, and alcohol. Warnings While Using This Medicine: · Tell your doctor if you are pregnant, breastfeeding, or have kidney disease, heart or blood vessel disease, heart rhythm problems, stomach or bowel problems, or a history of heart attack or stroke. · This medicine may make you drowsy, dizzy, or lightheaded.  Do not drive or do anything that could be dangerous until you know how this medicine affects you. · This medicine may cause dryness of the eyes. Talk to your doctor about how to treat the dryness. · Do not stop using this medicine suddenly. Your doctor will need to slowly decrease your dose before you stop it completely. · Tell any doctor or dentist who treats you that you are using this medicine. You may need to stop using this medicine several days before you have surgery or medical tests. · Even if you feel well, do not stop using the medicine without asking your doctor first. This medicine will not cure your high blood pressure, but it will help keep it in the normal range. You may have to take blood pressure medicine for the rest of your life. · Your doctor will check your progress and the effects of this medicine at regular visits. Keep all appointments. · Keep all medicine out of the reach of children. Never share your medicine with anyone. Possible Side Effects While Using This Medicine:  
Call your doctor right away if you notice any of these side effects: · Allergic reaction: Itching or hives, swelling in your face or hands, swelling or tingling in your mouth or throat, chest tightness, trouble breathing · Fast, slow, pounding, or uneven heartbeat · Lightheadedness, dizziness, fainting · Swelling in your hands, ankles, or feet · Unusual tiredness or weakness If you notice these less serious side effects, talk with your doctor: · Constipation, stomach pain · Dry eyes, mouth, or throat · Mild skin rash · New or worsening headache If you notice other side effects that you think are caused by this medicine, tell your doctor. Call your doctor for medical advice about side effects. You may report side effects to FDA at 9-896-FDA-2959 © 2017 [address] Information is for End User's use only and may not be sold, redistributed or otherwise used for commercial purposes.  
The above information is an educational aid only. It is not intended as medical advice for individual conditions or treatments. Talk to your doctor, nurse or pharmacist before following any medical regimen to see if it is safe and effective for you. Lisinopril (Prinivil, Zestril) - (By mouth) Why this medicine is used:  
Treats high blood pressure and heart failure. Contact a nurse or doctor right away if you have: · Blistering, peeling, red skin rash · Change in how much or how often you urinate · Confusion, weakness, uneven heartbeat, trouble breathing · Lightheadedness, dizziness, fainting · Numbness or tingling in your hands, feet, or lips Common side effects: · Dry cough © 2017 [facility] Information is for End User's use only and may not be sold, redistributed or otherwise used for commercial purposes. Lisinopril (By mouth) Lisinopril (lye-SIN-oh-pril) Treats high blood pressure and heart failure. Also given to reduce the risk of death after a heart attack. This medicine is an ACE inhibitor. Brand Name(s): Prinivil, Qbrelis, Zestril There may be other brand names for this medicine. When This Medicine Should Not Be Used: This medicine is not right for everyone. Do not use it if you had an allergic reaction to lisinopril or another ACE inhibitor, or if you are pregnant. How to Use This Medicine:  
Liquid, Tablet · Take your medicine as directed. Your dose may need to be changed several times to find what works best for you. · Oral liquid: Measure the oral liquid medicine with a marked measuring spoon, oral syringe, or medicine cup. · Missed dose: Take a dose as soon as you remember. If it is almost time for your next dose, wait until then and take a regular dose. Do not take extra medicine to make up for a missed dose. · Store the medicine in a closed container at room temperature, away from heat, moisture, and direct light. Drugs and Foods to Avoid: Ask your doctor or pharmacist before using any other medicine, including over-the-counter medicines, vitamins, and herbal products. · Do not use this medicine together with aliskiren if you have diabetes. · Some foods and medicines may affect how lisinopril works. Tell your doctor if you are using any of the following: ¨ Aliskiren, everolimus, lithium, sirolimus, temsirolimus ¨ Another blood pressure medicine, including an angiotensin receptor blocker (ARB) ¨ Diuretic (water pill, including amiloride, spironolactone, triamterene) ¨ Insulin or diabetes medicine ¨ NSAID pain or arthritis medicine (including aspirin, celecoxib, diclofenac, ibuprofen, naproxen) · Ask your doctor before you use any medicine, supplement, or salt substitute that contains potassium. Warnings While Using This Medicine: · It is not safe to take this medicine during pregnancy. It could harm an unborn baby. Tell your doctor right away if you become pregnant. · Tell your doctor if you are breastfeeding, or if you have kidney disease, liver disease, diabetes, or heart or blood vessel disease. · This medicine may cause the following problems: ¨ Angioedema (severe swelling) ¨ Kidney problems ¨ Serious liver problems · This medicine could lower your blood pressure too much, especially when you first use it or if you are dehydrated. Stand or sit up slowly if you feel lightheaded or dizzy. · Do not stop using this medicine without asking your doctor, even if you feel well. This medicine will not cure your high blood pressure, but it will help keep it in a normal range. You may have to take blood pressure medicine for the rest of your life. · Tell any doctor or dentist who treats you that you are using this medicine. · Your doctor will do lab tests at regular visits to check on the effects of this medicine. Keep all appointments. · Keep all medicine out of the reach of children. Never share your medicine with anyone.  
Possible Side Effects While Using This Medicine:  
Call your doctor right away if you notice any of these side effects: · Allergic reaction: Itching or hives, swelling in your face or hands, swelling or tingling in your mouth or throat, chest tightness, trouble breathing · Blistering, peeling, or red skin rash · Change in how much or how often you urinate · Confusion, weakness, uneven heartbeat, trouble breathing, numbness or tingling in your hands, feet, or lips · Dark urine or pale stools, nausea, vomiting, loss of appetite, stomach pain, yellow skin or eyes · Fever, chills, sore throat, body aches · Lightheadedness, dizziness, fainting · Severe stomach pain (with or without nausea or vomiting) If you notice these less serious side effects, talk with your doctor: · Dry cough If you notice other side effects that you think are caused by this medicine, tell your doctor. Call your doctor for medical advice about side effects. You may report side effects to FDA at 5-542-FDA-3214 © 2017 [address]  Information is for End User's use only and may not be sold, redistributed or otherwise used for commercial purposes. The above information is an  only. It is not intended as medical advice for individual conditions or treatments. Talk to your doctor, nurse or pharmacist before following any medical regimen to see if it is safe and effective for you.

## 2017-07-30 NOTE — ED PROVIDER NOTES
HPI Comments: 59 y.o. male with past medical history significant for PTSD, PSA elevation, HTN, prostate CA, sleep apnea and stroke who presents from home with chief complaint of chest pain. Per pt, he experienced onset of mid-sternal aching pain this morning upon waking around 0730 without radiation. He notes having a hx of HTN so he check his BP following noticing his CP. Upon evaluation, the pt reports that his BP read 191/120. While in the ED, he makes it known that his BP has appeared to be in 117'G-624'G systolic over the past month. Per pt, his CP has appeared worse with bending yet does not appear worsened with exertion. He rates his pain 6/10. The pt reports that he is currently prescribed Metoprolol as well as amlodipine which he has been taking as prescribed (metoprolol in the morning and amlodipine in the evening). Per pt, he took his Metoprolol this morning PTA. The pt reports that he has past hx of a stroke and was evaluated at [facility] where he was informed that stroke was likely related to HTN. He notes that he was instructed to take 81 mg ASA daily however he has not been taking it over the past month. When asked when he last obtained a stress test, the pt reports that it occurred sometime in April. He denies fever, N/V, diaphoresis, SOB and headaches. There are no other acute medical concerns at this time. Social hx: Non-smoker, No ETOH use  PCP: Brittney Salgado. MOISES Freitas    Note written by Kesha Watts, as dictated by Bandar Boswell MD 2:12 PM        The history is provided by the patient and the spouse.         Past Medical History:   Diagnosis Date    Cancer [facility]) 2016    prostate    Hypertension     PSA elevation     PTSD (post-traumatic stress disorder)     Sleep apnea     not compliant with CPAP    Stroke [facility])     April 2017       Past Surgical History:   Procedure Laterality Date    [place] PROSTATE BIOPSY, NEEDLE, SATURATION SAMPLING      x 2         Family History: Problem Relation Age of Onset    Heart Disease Mother     Diabetes Mother     Hypertension Mother     Cancer Mother     Substance Abuse Sister     Psychiatric Disorder Sister     Heart Disease Sister     Substance Abuse Sister        Social History     Social History    Marital status:      Spouse name: N/A    Number of children: N/A    Years of education: N/A     Occupational History    Not on file. Social History Main Topics    Smoking status: Never Smoker    Smokeless tobacco: Never Used    Alcohol use No    Drug use: No    Sexual activity: Not on file     Other Topics Concern    Not on file     Social History Narrative         ALLERGIES: Review of patient's allergies indicates no known allergies. Review of Systems   Constitutional: Negative. Negative for diaphoresis and fever. HENT: Negative. Eyes: Negative. Respiratory: Negative. Negative for shortness of breath. Cardiovascular: Positive for chest pain ( Onset this morning (7/30/2017) around 0730 upon waking. Pain located mid-sternally and is discribed as an aching pain  that has decreased yet remains present at this time. ). Gastrointestinal: Negative. Negative for nausea and vomiting. Endocrine: Negative. Genitourinary: Negative. Musculoskeletal: Negative. Skin: Negative. Allergic/Immunologic: Negative. Neurological: Negative. Hematological: Negative. Psychiatric/Behavioral: Negative. All other systems reviewed and are negative. Vitals:    07/30/17 1350 07/30/17 1407   BP: (!) 158/92    Pulse: 77    Resp: 16    Temp: 98.2 °F (36.8 °C)    SpO2: 97% 98%   Weight: 80.7 kg (178 lb)    Height: 5' 6\" (1.676 m)             Physical Exam   Constitutional: He is oriented to person, place, and time. He appears well-developed and well-nourished. No distress. HENT:   Head: Normocephalic and atraumatic. Eyes: Conjunctivae and EOM are normal. Pupils are equal, round, and reactive to light. Neck: Normal range of motion. Neck supple. Cardiovascular: Normal rate, regular rhythm, normal heart sounds and intact distal pulses. Exam reveals no friction rub. No murmur heard. Pulmonary/Chest: Effort normal and breath sounds normal. No respiratory distress. He has no wheezes. He has no rales. He exhibits tenderness. Abdominal: Soft. Bowel sounds are normal. He exhibits no distension. There is no tenderness. There is no rebound and no guarding. Musculoskeletal: Normal range of motion. He exhibits no edema. Neurological: He is alert and oriented to person, place, and time. Coordination normal.   Skin: Skin is warm and dry. He is not diaphoretic. No pallor. Psychiatric: He has a normal mood and affect. His behavior is normal.   Nursing note and vitals reviewed. MDM  Number of Diagnoses or Management Options  Abnormal EKG:   Essential hypertension:   Unstable angina [facility]):   Diagnosis management comments: Cp- abnl ekg but unchanged try nitro. Concern for angina given abnl ekg, bp and risk factors though does seem somewhat worse with movement and may be muscular       Amount and/or Complexity of Data Reviewed  Clinical lab tests: ordered and reviewed  Tests in the radiology section of CPT®: ordered and reviewed  Obtain history from someone other than the patient: yes (wife)  Discuss the patient with other providers: yes (hospitalist)  Independent visualization of images, tracings, or specimens: yes (ekg)    Patient Progress  Patient progress: stable    ED Course       Procedures      EKG interpretation: (Preliminary)  Rhythm: normal sinus rhythm; and regular .  Rate (approx.): 75; Axis: normal; P wave: normal; QRS interval: normal ; ST/T wave: T wave inverted; in  Lead: lateral leads unchanged from 8/15/16       2:58 PM  Pt reports pain gone after 1 nitroglycerin    4:14 PM  Spoke with dr Ailyn Quintana who will admit

## 2017-07-30 NOTE — Clinical Note
Patient Class[de-identified] Observation [354] Type of Bed: Telemetry [19] Reason for Observation: chest pain Admitting Diagnosis: Chest pain [033602] Admitting Physician: Laura Guzman Attending Physician: Laura Guzman

## 2017-07-30 NOTE — PROGRESS NOTES
TRANSFER - IN REPORT:    Verbal report received from [facility] Grace Tamez RN(name) on [employer]  being received from ED(unit) for routine progression of care      Report consisted of patients Situation, Background, Assessment and   Recommendations(SBAR). Information from the following report(s) SBAR, Kardex and Intake/Output was reviewed with the receiving nurse. Opportunity for questions and clarification was provided. Assessment completed upon patients arrival to unit and care assumed. Admission Assessment done. .Vss. David CP. .RA. Lars Sams Monitor- NSR. Lars Sams Bp150's. .Watching TV. Family @ bedside. .. Primary Nurse Jarek Montalvo, RN and Trent Lares RN, RN performed a dual skin assessment on this patient No impairment noted  Chris score is 23. .    1900-Bedside and Verbal shift change report given to [address] [place] [address] (oncoming nurse) by Quiana Cha (offgoing nurse). Report included the following information SBAR, Kardex and Recent Results.

## 2017-07-30 NOTE — H&P
[address]  [phone]    Admission History and Physical      NAME:  Ju Parra   :   1960   MRN:  [id number]     PCP:  Kalyani Munoz MD     Date/Time:  2017         Subjective:     CHIEF COMPLAINT: chest pain, elevated BP     HISTORY OF PRESENT ILLNESS:     Mr. Teri Owens is a 59 y.o.  male who is admitted with chest pain. Mr. Teri Owens with PMH of HTN, PTSD, MELLY, CVA presented to ER c/o chest pain, which woke him up from his sleep this morning. The chest pain is retrosternal, moderate in intensity without radiation. It is associated with SOB, but no nausea, vomiting or diaphoresis. Chest pain is continues until resolved after NTG. Exertion makes the pain worse. According to pt he has a cardiologist, Dr Nusrat Mon who did stress test in May of this year. Past Medical History:   Diagnosis Date    Cancer [facility])     prostate    Hypertension     PSA elevation     PTSD (post-traumatic stress disorder)     Sleep apnea     not compliant with CPAP    Stroke [facility])     2017        Past Surgical History:   Procedure Laterality Date    [place] PROSTATE BIOPSY, NEEDLE, SATURATION SAMPLING      x 2       Social History   Substance Use Topics    Smoking status: Never Smoker    Smokeless tobacco: Never Used    Alcohol use No        Family History   Problem Relation Age of Onset    Heart Disease Mother     Diabetes Mother     Hypertension Mother    [facility] Cancer Mother     Substance Abuse Sister     Psychiatric Disorder Sister     Heart Disease Sister     Substance Abuse Sister         No Known Allergies     Prior to Admission medications    Medication Sig Start Date End Date Taking? Authorizing Provider   amLODIPine (NORVASC) 10 mg tablet Take 10 mg by mouth daily. Yes Kalyani Munoz MD   metoprolol succinate (TOPROL-XL) 100 mg tablet Take  by mouth daily.    Yes Kalyani Munoz MD   hydrochlorothiazide (HYDRODIURIL) 25 mg tablet Take 25 mg by mouth daily. Yes Historical Provider   mirtazapine (REMERON) 15 mg tablet Take 15 mg by mouth nightly. Yes Historical Provider   temazepam (RESTORIL) 15 mg capsule Take 15 mg by mouth nightly as needed for Sleep. Yes Historical Provider   traZODone (DESYREL) 50 mg tablet Take 50 mg by mouth nightly as needed for Sleep. Yes Historical Provider   clonazePAM (KLONOPIN) 0.5 mg tablet Take 0.5 mg by mouth nightly as needed. Yes Phys Other, MD   FLUoxetine (PROZAC) 20 mg capsule Take  by mouth daily. Yes Phys Other, MD   simvastatin (ZOCOR) 10 mg tablet Take 10 mg by mouth nightly. Yes Phys Other, MD   docusate sodium (COLACE) 100 mg capsule Take 1 Cap by mouth two (2) times a day. 8/23/16   Kenia Wall MD   oxybutynin (DITROPAN) 5 mg tablet Take 1 Tab by mouth every eight (8) hours as needed for Other (Bladder Spasm). 8/23/16   Kenia Wall MD   oxyCODONE IR (ROXICODONE) 5 mg immediate release tablet Take 1 Tab by mouth every four (4) hours as needed. Max Daily Amount: 30 mg. As needed for pain 8/23/16   Kenia Wall MD   trimethoprim-sulfamethoxazole (BACTRIM DS, SEPTRA DS) 160-800 mg per tablet Take 1 Tab by mouth two (2) times a day. Start 1 day prior to catheter removal. 8/23/16   Kenia Wall MD   OTHER as needed for Other (Uses inhaler when needed for chronic bronchitis).  Indications: Pt. not sure of name of inhaler    Historical Provider         Review of Systems:  (bold if positive, if negative)    Gen:  Eyes:  ENT:  CVS:   chest painPulm:  GI:    :    MS:  Skin:  Psych:  Endo:    Hem:  Renal:    Neuro:            Objective:      VITALS:    Vital signs reviewed; most recent are:    Visit Vitals    /83    Pulse 67    Temp 98.2 °F (36.8 °C)    Resp 16    Ht 5' 6\" (1.676 m)    Wt 80.7 kg (178 lb)    SpO2 94%    BMI 28.73 kg/m2     SpO2 Readings from Last 6 Encounters:   07/30/17 94%   08/24/16 97%   08/15/16 98%   02/20/16 96%        No intake or output data in the 24 hours ending 07/30/17 9516         Exam:     Physical Exam:    Gen:  Well-developed, well-nourished, in no acute distress  HEENT:  Pink conjunctivae, PERRL, hearing intact to voice, moist mucous membranes  Neck:  Supple, without masses, thyroid non-tender  Resp:  No accessory muscle use, clear breath sounds without wheezes rales or rhonchi  Card:  No murmurs, normal S1, S2 without thrills, bruits or peripheral edema  Abd:  Soft, non-tender, non-distended, normoactive bowel sounds are present, no palpable organomegaly and no detectable hernias  Lymph:  No cervical or inguinal adenopathy  Musc:  No cyanosis or clubbing  Skin:  No rashes or ulcers, skin turgor is good  Neuro:  Cranial nerves are grossly intact, no focal motor weakness, follows commands appropriately  Psych:  Good insight, oriented to person, place and time, alert       Labs:    Recent Labs      07/30/17   1433   WBC  3.4*   HGB  12.7   HCT  36.9   PLT  261     Recent Labs      07/30/17   1433   NA  142   K  3.1*   CL  103   CO2  32   GLU  113*   BUN  20   CREA  1.50*   CA  8.6   ALB  3.5   TBILI  0.2   SGOT  30   ALT  53     Lab Results   Component Value Date/Time    Glucose (POC) 97 08/22/2016 12:05 PM     No results for input(s): PH, PCO2, PO2, HCO3, FIO2 in the last 72 hours. No results for input(s): INR in the last 72 hours. No lab exists for component: INREXT    Telemetry reviewed:   normal sinus rhythm       Assessment/Plan:    1. Chest pain (7/30/2017). Admit to telemetry. EKG is nonischemic. First troponin is negative. Check searial cardiac enzymes, echocardiogram. ASA. Consult cardiology. 2.  HTN (hypertension) (7/30/2017). On admission BP was high which later improved after he was given his meds. Continue home metoprolol. Will hold lisinopril and HCTZ. Added amlodipine. 3.  Hypokalemia. Replete. 4.  Prostate cancer ([address] (8/22/2016) s/p post prostatectomy. Follows with Dr Miguel Townsend.      5. Acute on CKD (chronic kidney disease) stage 3, GFR 30-59 ml/min (7/30/2017). Continue to monitor. May need outpatient nephrology evaluation. Hold HCTZ and lisinopril. 6.  PTSD (post-traumatic stress disorder) (7/30/2017). Continue home meds. 7.  MELLY (obstructive sleep apnea) (7/30/2017). Not using CPAP.              Previous medical records reviewed     Risk of deterioration: high      Total time spent with patient: 79 [address] [facility] discussed with: Patient, Family, Nursing Staff and >50% of time spent in counseling and coordination of care    Discussed:  Care Plan    Prophylaxis:  Hep SQ    Probable Disposition:  Home w/Family           ___________________________________________________    Attending Physician: Rajat Estrada MD

## 2017-07-30 NOTE — IP AVS SNAPSHOT
[address] 
[phone] Patient: Annabel Hannah MRN: [id number] Current Discharge Medication List  
  
START taking these medications Dose & Instructions Dispensing Information Comments Morning Noon Evening Bedtime  
 amLODIPine 5 mg tablet Commonly known as:  Murali Franco Your last dose was:  8/1 9:05 am  
Your next dose is:  8/2 9:00 am  
   
 Dose:  5 mg Take 1 Tab by mouth daily. Quantity:  30 Tab Refills:  0  
     
   
   
   
  
 aspirin 81 mg chewable tablet Your last dose was:  8/1  9:00 am  
Your next dose is:  8/2  9:05 am  
   
 Dose:  81 mg Take 1 Tab by mouth daily. Quantity:  30 Tab Refills:  0  
     
   
   
   
  
 atorvastatin 20 mg tablet Commonly known as:  LIPITOR Your last dose was:  7/31  9:30 pm  
Your next dose is:  8/1  9:30 pm  
   
 Dose:  20 mg Take 1 Tab by mouth nightly. Quantity:  30 Tab Refills:  0  
     
   
   
   
  
 cloNIDine HCl 0.1 mg tablet Commonly known as:  CATAPRES Notes to Patient:  AS NEEDED FOR HIGH BLOOD PRESSURE Dose:  0.1 mg Take 1 Tab by mouth as needed (as needed for BP>170/100). Quantity:  30 Tab Refills:  0  
     
   
   
   
  
 lisinopril 20 mg tablet Commonly known as:  Ramiro Ireland Your last dose was:  8/1  9:05 am  
Your next dose is:  8/2  9:00 am  
   
 Dose:  20 mg Take 1 Tab by mouth daily. Quantity:  30 Tab Refills:  0 CONTINUE these medications which have CHANGED Dose & Instructions Dispensing Information Comments Morning Noon Evening Bedtime  
 metoprolol succinate 100 mg tablet Commonly known as:  TOPROL-XL What changed:   
- medication strength 
- how much to take Your last dose was:  8/1  9:00 am  
Your next dose is:  8/2  9:00 am  
   
 Dose:  100 mg Take 1 Tab by mouth daily. Quantity:  30 Tab Refills:  0 CONTINUE these medications which have NOT CHANGED Dose & Instructions Dispensing Information Comments Morning Noon Evening Bedtime buPROPion  mg SR tablet Commonly known as:  Malu Monroy Your last dose was:  8/1  9:00 am  
Your next dose is:  8/1  6:00 pm  
   
 Dose:  150 mg Take 150 mg by mouth two (2) times a day. Refills:  0  
     
   
   
   
  
 * clonazePAM 0.5 mg tablet Commonly known as:  Brijesh Primer Your last dose was:  7/31  9:30 pm  
Your next dose is:  8/1  9:30 pm  
   
 Dose:  0.5 mg Take 0.5 mg by mouth nightly. Refills:  0  
     
   
   
   
  
 * clonazePAM 2 mg tablet Commonly known as:  Brijesh Primer Your last dose was:  8/1 9:00 am  
Your next dose is:  8/2  9:00 am  
   
 Dose:  2 mg Take 2 mg by mouth daily. Refills:  0  
     
   
   
   
  
 mirtazapine 15 mg tablet Commonly known as:  Brigid Esparza Notes to Patient:  AS NEEDED Dose:  15 mg Take 15 mg by mouth nightly as needed. Refills:  0 PROzac 40 mg capsule Generic drug:  FLUoxetine Your last dose was:  8/1  9:05 am  
Your next dose is:  8/2  9:00 am  
   
 Dose:  40 mg Take 40 mg by mouth daily. Refills:  0  
     
   
   
   
  
 temazepam 15 mg capsule Commonly known as:  RESTORIL Notes to Patient:  AS NEEDED Dose:  15 mg Take 15 mg by mouth nightly as needed for Sleep. Refills:  0  
     
   
   
   
  
 traZODone 50 mg tablet Commonly known as:  Som Grills Your last dose was:  7/31  9:30 pm  
Your next dose is:  8/1  9:30 pm  
   
 Dose:  50 mg Take 50 mg by mouth nightly. Refills:  0  
     
   
   
   
  
 * Notice: This list has 2 medication(s) that are the same as other medications prescribed for you. Read the directions carefully, and ask your doctor or other care provider to review them with you. STOP taking these medications   
 lisinopril-hydroCHLOROthiazide 20-12.5 mg per tablet Commonly known as: Juan Elise Where to Get Your Medications Information on where to get these meds will be given to you by the nurse or doctor. ! Ask your nurse or doctor about these medications  
  amLODIPine 5 mg tablet  
 aspirin 81 mg chewable tablet  
 atorvastatin 20 mg tablet  
 cloNIDine HCl 0.1 mg tablet  
 lisinopril 20 mg tablet  
 metoprolol succinate 100 mg tablet

## 2017-07-30 NOTE — PROGRESS NOTES
[facility]: MED RECONCILIATION    Comments/Recommendations:   · Patient is suppose to take ASA 81 mg daily and Vit D 5000 units daily, but stopped about a month ago due to NOT wanting to pay for supply over the counter. · Patient states he has not taken any \"statins\" due to no supply (most recent filled Simvastatin 20 mg on 5/16/2017; Atorvastatin 10 mg on 2/2017). · Some of the dosages given by the patient does not line up with recently filled medications (reliability of information?)  · Patient states he is very compliant with taking him medications daily (using a weekly pill container). Medication(s) ADDED to PTA list:  1. Lisinopril/HCTZ 20/12.5 mg  2. Bupropion  mg BID  3. Clonazepam 2 mg daily    Medication(s) REMOVED from PTA list:  1. Amlodipine 10 mg daily  2. Docusate 100 mg BID  3. HCTZ 25 mg daily  4. Oxybutynin 5 mg Q 8 hrs prn  5. Oxycodone 5 mg prn  6. [id number] mg QHS  7. Bactrim DS    Medication(s) ADJUSTED on PTA list:  1. Clonazepam 0.5 mg changed to schedule HS (from prn)  2. Trazodone changed to scheduled HS (from prn)- patient states he used 50 mg, though most recent fill is for 100 mg.  3. Mirtazapine changed to \"PRN\" from scheduled    Information obtained from: Rx Query/ Patient    Allergies: Review of patient's allergies indicates no known allergies. Prior to Admission Medications:     Medication Documentation Review Audit      Prior to Admission Medications   Prescriptions Last Dose Informant Patient Reported? Taking? FLUoxetine (PROZAC) 40 mg capsule 7/30/2017 at am  Yes Yes   Sig: Take 40 mg by mouth daily. buPROPion SR (WELLBUTRIN SR) 150 mg SR tablet 7/30/2017 at am  Yes Yes   Sig: Take 150 mg by mouth two (2) times a day. clonazePAM (KLONOPIN) 0.5 mg tablet 7/29/2017 at PM  Yes Yes   Sig: Take 0.5 mg by mouth nightly. clonazePAM (KLONOPIN) 2 mg tablet 7/30/2017 at am  Yes Yes   Sig: Take 2 mg by mouth daily.    lisinopril-hydroCHLOROthiazide (Juan Elise) 20-12.5 mg per tablet 7/29/2017 at pm  Yes Yes   Sig: Take 1 Tab by mouth nightly. metoprolol succinate (TOPROL-XL) 50 mg XL tablet 7/30/2017 at am  Yes Yes   Sig: Take 50 mg by mouth daily. mirtazapine (REMERON) 15 mg tablet 7/29/2017 at Unknown time  Yes Yes   Sig: Take 15 mg by mouth nightly as needed. temazepam (RESTORIL) 15 mg capsule   Yes Yes   Sig: Take 15 mg by mouth nightly as needed for Sleep.   traZODone (DESYREL) 50 mg tablet 7/29/2017 at pm  Yes Yes   Sig: Take 50 mg by mouth nightly.       Facility-Administered Medications: None           [address], PHARMD   Contact: [phone]

## 2017-07-31 LAB
ANION GAP BLD CALC-SCNC: 6 MMOL/L (ref 5–15)
ATRIAL RATE: 77 BPM
BASOPHILS # BLD AUTO: 0 K/UL (ref 0–0.1)
BASOPHILS # BLD: 0 % (ref 0–1)
BUN SERPL-MCNC: 15 MG/DL (ref 6–20)
BUN/CREAT SERPL: 11 (ref 12–20)
CALCIUM SERPL-MCNC: 9.1 MG/DL (ref 8.5–10.1)
CALCULATED P AXIS, ECG09: 54 DEGREES
CALCULATED R AXIS, ECG10: 60 DEGREES
CALCULATED T AXIS, ECG11: -42 DEGREES
CHLORIDE SERPL-SCNC: 106 MMOL/L (ref 97–108)
CO2 SERPL-SCNC: 30 MMOL/L (ref 21–32)
CREAT SERPL-MCNC: 1.37 MG/DL (ref 0.7–1.3)
DIAGNOSIS, 93000: NORMAL
EOSINOPHIL # BLD: 0.1 K/UL (ref 0–0.4)
EOSINOPHIL NFR BLD: 5 % (ref 0–7)
ERYTHROCYTE [DISTWIDTH] IN BLOOD BY AUTOMATED COUNT: 13.2 % (ref 11.5–14.5)
GLUCOSE SERPL-MCNC: 88 MG/DL (ref 65–100)
HCT VFR BLD AUTO: 40.4 % (ref 36.6–50.3)
HGB BLD-MCNC: 13.6 G/DL (ref 12.1–17)
LYMPHOCYTES # BLD AUTO: 46 % (ref 12–49)
LYMPHOCYTES # BLD: 1.2 K/UL (ref 0.8–3.5)
MCH RBC QN AUTO: 29.5 PG (ref 26–34)
MCHC RBC AUTO-ENTMCNC: 33.7 G/DL (ref 30–36.5)
MCV RBC AUTO: 87.6 FL (ref 80–99)
MONOCYTES # BLD: 0.2 K/UL (ref 0–1)
MONOCYTES NFR BLD AUTO: 9 % (ref 5–13)
NEUTS SEG # BLD: 1.1 K/UL (ref 1.8–8)
NEUTS SEG NFR BLD AUTO: 40 % (ref 32–75)
P-R INTERVAL, ECG05: 184 MS
PLATELET # BLD AUTO: 247 K/UL (ref 150–400)
POTASSIUM SERPL-SCNC: 3.1 MMOL/L (ref 3.5–5.1)
Q-T INTERVAL, ECG07: 366 MS
QRS DURATION, ECG06: 90 MS
QTC CALCULATION (BEZET), ECG08: 414 MS
RBC # BLD AUTO: 4.61 M/UL (ref 4.1–5.7)
SODIUM SERPL-SCNC: 142 MMOL/L (ref 136–145)
VENTRICULAR RATE, ECG03: 77 BPM
WBC # BLD AUTO: 2.7 K/UL (ref 4.1–11.1)

## 2017-07-31 PROCEDURE — 96372 THER/PROPH/DIAG INJ SC/IM: CPT

## 2017-07-31 PROCEDURE — 74011250637 HC RX REV CODE- 250/637: Performed by: INTERNAL MEDICINE

## 2017-07-31 PROCEDURE — 74011250636 HC RX REV CODE- 250/636: Performed by: INTERNAL MEDICINE

## 2017-07-31 PROCEDURE — 96375 TX/PRO/DX INJ NEW DRUG ADDON: CPT

## 2017-07-31 PROCEDURE — 85025 COMPLETE CBC W/AUTO DIFF WBC: CPT | Performed by: INTERNAL MEDICINE

## 2017-07-31 PROCEDURE — 80048 BASIC METABOLIC PNL TOTAL CA: CPT | Performed by: INTERNAL MEDICINE

## 2017-07-31 PROCEDURE — 74011250637 HC RX REV CODE- 250/637: Performed by: SPECIALIST

## 2017-07-31 PROCEDURE — 36415 COLL VENOUS BLD VENIPUNCTURE: CPT | Performed by: INTERNAL MEDICINE

## 2017-07-31 PROCEDURE — C8923 2D TTE W OR W/O FOL W/CON,CO: HCPCS

## 2017-07-31 PROCEDURE — 99218 HC RM OBSERVATION: CPT

## 2017-07-31 RX ORDER — LISINOPRIL 20 MG/1
20 TABLET ORAL DAILY
Status: DISCONTINUED | OUTPATIENT
Start: 2017-07-31 | End: 2017-08-01 | Stop reason: HOSPADM

## 2017-07-31 RX ORDER — ATORVASTATIN CALCIUM 20 MG/1
20 TABLET, FILM COATED ORAL
Status: DISCONTINUED | OUTPATIENT
Start: 2017-07-31 | End: 2017-08-01 | Stop reason: HOSPADM

## 2017-07-31 RX ORDER — POTASSIUM CHLORIDE 750 MG/1
40 TABLET, FILM COATED, EXTENDED RELEASE ORAL EVERY 4 HOURS
Status: COMPLETED | OUTPATIENT
Start: 2017-07-31 | End: 2017-07-31

## 2017-07-31 RX ORDER — GUAIFENESIN 100 MG/5ML
81 LIQUID (ML) ORAL DAILY
Status: DISCONTINUED | OUTPATIENT
Start: 2017-08-01 | End: 2017-08-01 | Stop reason: HOSPADM

## 2017-07-31 RX ORDER — HYDRALAZINE HYDROCHLORIDE 20 MG/ML
10 INJECTION INTRAMUSCULAR; INTRAVENOUS
Status: DISCONTINUED | OUTPATIENT
Start: 2017-07-31 | End: 2017-07-31

## 2017-07-31 RX ORDER — METOPROLOL SUCCINATE 50 MG/1
100 TABLET, EXTENDED RELEASE ORAL DAILY
Status: DISCONTINUED | OUTPATIENT
Start: 2017-08-01 | End: 2017-08-01 | Stop reason: HOSPADM

## 2017-07-31 RX ORDER — ONDANSETRON 2 MG/ML
4 INJECTION INTRAMUSCULAR; INTRAVENOUS
Status: DISCONTINUED | OUTPATIENT
Start: 2017-07-31 | End: 2017-08-01 | Stop reason: HOSPADM

## 2017-07-31 RX ORDER — METOPROLOL SUCCINATE 50 MG/1
50 TABLET, EXTENDED RELEASE ORAL ONCE
Status: COMPLETED | OUTPATIENT
Start: 2017-07-31 | End: 2017-07-31

## 2017-07-31 RX ADMIN — BUPROPION HYDROCHLORIDE 150 MG: 150 TABLET, EXTENDED RELEASE ORAL at 17:44

## 2017-07-31 RX ADMIN — TRAZODONE HYDROCHLORIDE 50 MG: 50 TABLET ORAL at 21:38

## 2017-07-31 RX ADMIN — AMLODIPINE BESYLATE 5 MG: 5 TABLET ORAL at 09:05

## 2017-07-31 RX ADMIN — ASPIRIN 325 MG: 325 TABLET, COATED ORAL at 09:05

## 2017-07-31 RX ADMIN — METOPROLOL SUCCINATE 50 MG: 50 TABLET, FILM COATED, EXTENDED RELEASE ORAL at 11:32

## 2017-07-31 RX ADMIN — FLUOXETINE HYDROCHLORIDE 40 MG: 20 CAPSULE ORAL at 09:04

## 2017-07-31 RX ADMIN — HEPARIN SODIUM 5000 UNITS: 5000 INJECTION, SOLUTION INTRAVENOUS; SUBCUTANEOUS at 11:32

## 2017-07-31 RX ADMIN — ATORVASTATIN CALCIUM 20 MG: 20 TABLET, FILM COATED ORAL at 21:38

## 2017-07-31 RX ADMIN — METOPROLOL SUCCINATE 50 MG: 50 TABLET, FILM COATED, EXTENDED RELEASE ORAL at 09:05

## 2017-07-31 RX ADMIN — POTASSIUM CHLORIDE 40 MEQ: 750 TABLET, FILM COATED, EXTENDED RELEASE ORAL at 17:44

## 2017-07-31 RX ADMIN — POTASSIUM CHLORIDE 40 MEQ: 750 TABLET, FILM COATED, EXTENDED RELEASE ORAL at 13:15

## 2017-07-31 RX ADMIN — Medication 10 ML: at 21:38

## 2017-07-31 RX ADMIN — CLONAZEPAM 2 MG: 1 TABLET ORAL at 09:04

## 2017-07-31 RX ADMIN — LISINOPRIL 20 MG: 20 TABLET ORAL at 11:32

## 2017-07-31 RX ADMIN — MIRTAZAPINE 15 MG: 15 TABLET, FILM COATED ORAL at 21:38

## 2017-07-31 RX ADMIN — HYDRALAZINE HYDROCHLORIDE 10 MG: 20 INJECTION INTRAMUSCULAR; INTRAVENOUS at 13:50

## 2017-07-31 RX ADMIN — HEPARIN SODIUM 5000 UNITS: 5000 INJECTION, SOLUTION INTRAVENOUS; SUBCUTANEOUS at 17:44

## 2017-07-31 RX ADMIN — CLONAZEPAM 0.5 MG: 0.5 TABLET ORAL at 21:37

## 2017-07-31 RX ADMIN — HEPARIN SODIUM 5000 UNITS: 5000 INJECTION, SOLUTION INTRAVENOUS; SUBCUTANEOUS at 02:44

## 2017-07-31 RX ADMIN — BUPROPION HYDROCHLORIDE 150 MG: 150 TABLET, EXTENDED RELEASE ORAL at 09:04

## 2017-07-31 RX ADMIN — Medication 10 ML: at 13:40

## 2017-07-31 RX ADMIN — ONDANSETRON 4 MG: 2 INJECTION INTRAMUSCULAR; INTRAVENOUS at 18:33

## 2017-07-31 NOTE — PROGRESS NOTES
Bedside and Verbal shift change report given to Romana Adair RN (oncoming nurse) by Pedro Arriaza RN (offgoing nurse). Report included the following information SBAR, Kardex, MAR and Cardiac Rhythm NSR. Medical records from [facility] in patient's chart. 1810 - Patient stated \"I started throwing up. \" When asked, the patient stated \"Right after you gave me that medicine in the IV. \" After asking the patient if it was the medicine to lower his blood pressure, the patient stated \"yes. \" Reminded patient to call for the nurse for assistance with any issue. Will call Dr. Nisa Haines. 1830 - Telephone order with verbal read back to discontinue prn hydralazine and add an order of 4 mg Zofran Q8 hours, prn.    Bedside and Verbal shift change report given to Thanh Andre RN (oncoming nurse) by Romana Adair RN (offgoing nurse). Report included the following information SBAR, Kardex, MAR and Cardiac Rhythm NSR.

## 2017-07-31 NOTE — PROGRESS NOTES
Enrrique Puentes [address]  [address] [facility], [address] [place] [address]  [phone]      Medical Progress Note      NAME:         Rohini Genao   :        1960  MRM:        [id number]    Date:          2017      Subjective: Patient has been seen and examined as a follow up for chest pain. Chart, labs, diagnostics reviewed. He says he feels better with minimal chest aching discomfort. No SOB, cough or fever. No other symptoms. Objective:    Vital Signs:    Visit Vitals    BP (!) 158/102    Pulse 79    Temp 97.9 °F (36.6 °C)    Resp 16    Ht 5' 6\" (1.676 m)    Wt 77 kg (169 lb 12.1 oz)    SpO2 99%    BMI 27.4 kg/m2        Intake/Output Summary (Last 24 hours) at 17 1140  Last data filed at 17 0522   Gross per 24 hour   Intake              850 ml   Output              500 ml   Net              350 ml        Physical Examination:    General:   Weak looking and nourished patient in no acute distress  Eyes:   pink conjunctivae, PERRLA with no discharge. ENT:   no ottorrhea or rhinorrhea with moist mucous membranes  Neck: no masses, thyroid non-tender and trachea central.  Pulm: clear breath sounds without crackles or wheezes  Card:  no JVD or murmurs, has regular and normal S1, S2 without thrills, bruits or peripheral edema  Abd:  Soft, non-tender, non-distended, normoactive bowel sounds with no palpable organomegaly  Musc:  No cyanosis, clubbing, atrophy or deformities. Skin:  No rashes, bruising or ulcers. Neuro: Awake and alert.  Generally a non focal exam.   Psych:  Has a good insight and is oriented x 3    Current Facility-Administered Medications   Medication Dose Route Frequency    lisinopril (PRINIVIL, ZESTRIL) tablet 20 mg  20 mg Oral DAILY    [START ON 2017] metoprolol succinate (TOPROL-XL) XL tablet 100 mg  100 mg Oral DAILY    [START ON 2017] aspirin chewable tablet 81 mg  81 mg Oral DAILY    atorvastatin (LIPITOR) tablet 20 mg  20 mg Oral QHS    sodium chloride (NS) flush 5-10 mL  5-10 mL IntraVENous Q8H    sodium chloride (NS) flush 5-10 mL  5-10 mL IntraVENous PRN    heparin (porcine) injection 5,000 Units  5,000 Units SubCUTAneous Q8H    buPROPion SR (WELLBUTRIN SR) tablet 150 mg  150 mg Oral BID    clonazePAM (KlonoPIN) tablet 0.5 mg  0.5 mg Oral QHS    clonazePAM (KlonoPIN) tablet 2 mg  2 mg Oral DAILY    FLUoxetine (PROzac) capsule 40 mg  40 mg Oral DAILY    mirtazapine (REMERON) tablet 15 mg  15 mg Oral QHS    temazepam (RESTORIL) capsule 15 mg  15 mg Oral QHS PRN    traZODone (DESYREL) tablet 50 mg  50 mg Oral QHS    amLODIPine (NORVASC) tablet 5 mg  5 mg Oral DAILY        Laboratory data and review:    Recent Labs      07/31/17   0531  07/30/17   1433   WBC  2.7*  3.4*   HGB  13.6  12.7   HCT  40.4  36.9   PLT  247  261     Recent Labs      07/31/17   0531  07/30/17   1433   NA  142  142   K  3.1*  3.1*   CL  106  103   CO2  30  32   GLU  88  113*   BUN  15  20   CREA  1.37*  1.50*   CA  9.1  8.6   ALB   --   3.5   SGOT   --   30   ALT   --   53     No components found for: [place]    Other Diagnostics:    Telemetry reviewed:   normal sinus rhythm    Assessment and Plan:    Chest pain (7/30/2017): atypical. Seems to have resolved. Initial EKG and telemetry non ischemic. Troponin neg. Had nonetheless not been taking his medications right. Seen by cardiology and I have discussed with Dr Ermelinda Jones, will need to monitor another day to optimize BP control and monitor symptoms. Echo showed a normal EF. Continue ASA, Metoprolol and monitor        HTN (hypertension) (7/30/2017): BP variable but overall uncontrolled. Continue adjusted medications with Metoprolol, Lisinopril and Amlodipine. Monitor BPs     Hypokalemia: replete some more. Acute on CKD (chronic kidney disease) stage 3, GFR 30-59 ml/min (7/30/2017): Cr stable and likely at baseline for past year. Out patient monitoring.    PTSD (post-traumatic stress disorder) (7/30/2017): continue Wellbutrin, Klonopin, Prozac and Remeron      Prostate cancer ([facility] [address] (8/22/2016) s/p post prostatectomy. Stable. Continue follow up with Dr Cinthya Brewer.      MELLY (obstructive sleep apnea) (7/30/2017). Not using CPAP.      Total time spent for the patient's care: 8[facility] discussed with: Patient, Family, Nursing Staff and Consultant/Specialist    Discussed:  Care Plan and D/C Planning    Prophylaxis:  Hep SQ    Anticipated Disposition:  Home w/Family           ___________________________________________________    Attending Physician:   Sima Holt MD

## 2017-07-31 NOTE — CONSULTS
Antonino Villanueva MD. [facility]      Patient: Ivonne Braun  : 1960    Today's Date: 2017    HISTORY OF PRESENT ILLNESS:     History of Present Illness:    Ivonne Braun is a 59 y.o. male presented to the hospital yesterday for chest pain. He awoke at ~7:30 am with substernal chest pain, non-radiating, throbbing in nature, 6/10 intensity. Took bp on home cuff, reports 191/120. Presented to ER for chest pain, reports it got better with nitro-paste, pain didn't go away until last night. Notes TIA or CVA in April at [facility], started seen a cardiologist at that time, a Dr. Blanca Conner. Notes he had a stress test in April that was \"normal\" and he was to be on 81 mg ASA and 10 mg Lipitor but hasn't taken either of these in the past 1 month.         PAST MEDICAL HISTORY:     Past Medical History:   Diagnosis Date    Cancer [facility])     prostate    Hypertension     PSA elevation     PTSD (post-traumatic stress disorder)     Sleep apnea     not compliant with CPAP    Stroke [facility])     2017       Past Surgical History:   Procedure Laterality Date    IA PROSTATE BIOPSY, NEEDLE, SATURATION SAMPLING      x 2         MEDICATIONS:     Current Facility-Administered Medications   Medication Dose Route Frequency Provider Last Rate Last Dose    lisinopril (PRINIVIL, ZESTRIL) tablet 20 mg  20 mg Oral DAILY Ed Riedel, MD        [START ON 2017] metoprolol succinate (TOPROL-XL) XL tablet 100 mg  100 mg Oral DAILY Ed Riedel, MD        metoprolol succinate (TOPROL-XL) XL tablet 50 mg  50 mg Oral ONCE Ed Riedel, MD        [START ON 2017] aspirin chewable tablet 81 mg  81 mg Oral DAILY Ed Riedel, MD        atorvastatin (LIPITOR) tablet 20 mg  20 mg Oral QHS Ed Riedel, MD        sodium chloride (NS) flush 5-10 mL  5-10 mL IntraVENous Lynn Rodriguez MD   10 mL at 171    sodium chloride (NS) flush 5-10 mL  5-10 mL IntraVENous PRN Az Benjamin MD        heparin (porcine) injection 5,000 Units  5,000 Units SubCUTAneous Julian Linares MD   5,000 Units at 07/31/17 0244    buPROPion SR (WELLBUTRIN SR) tablet 150 mg  150 mg Oral BID Az Benjamin MD   150 mg at 07/31/17 0904    clonazePAM (KlonoPIN) tablet 0.5 mg  0.5 mg Oral QHS Az Benjamin MD   0.5 mg at 07/30/17 2221    clonazePAM (KlonoPIN) tablet 2 mg  2 mg Oral DAILY Az Benjamin MD   2 mg at 07/31/17 1058    FLUoxetine (PROzac) capsule 40 mg  40 mg Oral DAILY Az Benjamin MD   40 mg at 07/31/17 0904    mirtazapine (REMERON) tablet 15 mg  15 mg Oral QHS Az Benjamin MD   15 mg at 07/30/17 2221    temazepam (RESTORIL) capsule 15 mg  15 mg Oral QHS PRN Az Benjamin MD        traZODone (DESYREL) tablet 50 mg  50 mg Oral QHS Az Benjamin MD   50 mg at 07/30/17 2221    amLODIPine (NORVASC) tablet 5 mg  5 mg Oral DAILY Az Benjamin MD   5 mg at 07/31/17 0905       No Known Allergies      SOCIAL HISTORY:     Social History   Substance Use Topics    Smoking status: Never Smoker    Smokeless tobacco: Never Used    Alcohol use No         FAMILY HISTORY:     Family History   Problem Relation Age of Onset    Heart Disease Mother     Diabetes Mother     Hypertension Mother     Cancer Mother     Substance Abuse Sister     Psychiatric Disorder Sister     Heart Disease Sister     Substance Abuse Sister          REVIEW OF SYMPTOMS:     Review of Symptoms:  Constitutional: Negative for fever, chills  HEENT: Negative for nosebleeds, tinnitus, and vision changes. Respiratory: Negative for cough, wheezing  Cardiovascular: Negative for orthopnea, claudication, syncope, and PND. Gastrointestinal: Negative for abdominal pain, diarrhea, melena. Genitourinary: Negative for dysuria  Musculoskeletal: Negative for myalgias. Skin: Negative for rash  Heme: No problems bleeding. Neurological: Negative for speech change and focal weakness.        PHYSICAL EXAM:     Physical Exam:  Visit Vitals    BP (!) 158/102    Pulse 79    Temp 97.9 °F (36.6 °C)    Resp 16    Ht 5' 6\" (1.676 m)    Wt 169 lb 12.1 oz (77 kg)    SpO2 99%    BMI 27.4 kg/m2     Patient appears generally well, mood and affect are appropriate and pleasant. HEENT:  Hearing intact, non-icteric, normocephalic, atraumatic. Neck Exam: Supple or carotid bruits. Lung Exam: Clear to auscultation, even breath sounds. Cardiac Exam: Regular rate and rhythm with no murmur  Abdomen: Soft, non-tender, normal bowel sounds. No bruits or masses. Extremities: Moves all ext well. No lower extremity edema. Vascular: 2+ dorsalis pedis pulses bilaterally. Psych: Appropriate affect  Neuro - Grossly intact      LABS / OTHER STUDIES:     Recent Results (from the past 24 hour(s))   EKG, 12 LEAD, INITIAL    Collection Time: 07/30/17  1:55 PM   Result Value Ref Range    Ventricular Rate 77 BPM    Atrial Rate 77 BPM    P-R Interval 184 ms    QRS Duration 90 ms    Q-T Interval 366 ms    QTC Calculation (Bezet) 414 ms    Calculated P Axis 54 degrees    Calculated R Axis 60 degrees    Calculated T Axis -42 degrees    Diagnosis       Normal sinus rhythm  Minimal voltage criteria for LVH, may be normal variant  T wave abnormality, consider anterolateral ischemia  Abnormal ECG  When compared with ECG of 15-AUG-2016 10:59,  No significant change was found  Confirmed by Aminta ZIEGLER, Med Hicks ([id number]) on 7/31/2017 8:32:53 AM     CBC WITH AUTOMATED DIFF    Collection Time: 07/30/17  2:33 PM   Result Value Ref Range    WBC 3.4 (L) 4.1 - 11.1 K/uL    RBC 4.27 4. 10 - 5.70 M/uL    HGB 12.7 12.1 - 17.0 g/dL    HCT 36.9 36.6 - 50.3 %    MCV 86.4 80.0 - 99.0 FL    MCH 29.7 26.0 - 34.0 PG    MCHC 34.4 30.0 - 36.5 g/dL    RDW 13.2 11.5 - 14.5 %    PLATELET 904 175 - 278 K/uL    NEUTROPHILS 59 32 - 75 %    LYMPHOCYTES 32 12 - 49 %    MONOCYTES 7 5 - 13 %    EOSINOPHILS 2 0 - 7 %    BASOPHILS 0 0 - 1 %    ABS. NEUTROPHILS 2.0 1.8 - 8.0 K/UL    ABS. LYMPHOCYTES 1.1 0.8 - 3.5 K/UL    ABS.  MONOCYTES 0.2 0.0 - 1.0 K/UL    ABS. EOSINOPHILS 0.1 0.0 - 0.4 K/UL    ABS. BASOPHILS 0.0 0.0 - 0.1 K/UL   METABOLIC PANEL, COMPREHENSIVE    Collection Time: 07/30/17  2:33 PM   Result Value Ref Range    Sodium 142 136 - 145 mmol/L    Potassium 3.1 (L) 3.5 - 5.1 mmol/L    Chloride 103 97 - 108 mmol/L    CO2 32 21 - 32 mmol/L    Anion gap 7 5 - 15 mmol/L    Glucose 113 (H) 65 - 100 mg/dL    BUN 20 6 - 20 MG/DL    Creatinine 1.50 (H) 0.70 - 1.30 MG/DL    BUN/Creatinine ratio 13 12 - 20      GFR est AA 58 (L) >60 ml/min/1.73m2    GFR est non-AA 48 (L) >60 ml/min/1.73m2    Calcium 8.6 8.5 - 10.1 MG/DL    Bilirubin, total 0.2 0.2 - 1.0 MG/DL    ALT (SGPT) 53 12 - 78 U/L    AST (SGOT) 30 15 - 37 U/L    Alk. phosphatase 127 (H) 45 - 117 U/L    Protein, total 7.1 6.4 - 8.2 g/dL    Albumin 3.5 3.5 - 5.0 g/dL    Globulin 3.6 2.0 - 4.0 g/dL    A-G Ratio 1.0 (L) 1.1 - 2.2     TROPONIN I    Collection Time: 07/30/17  2:33 PM   Result Value Ref Range    Troponin-I, Qt. <0.04 <0.05 ng/mL   TROPONIN I    Collection Time: 07/30/17 10:19 PM   Result Value Ref Range    Troponin-I, Qt. <0.04 <0.05 ng/mL   CK W/ CKMB & INDEX    Collection Time: 07/30/17 10:19 PM   Result Value Ref Range     39 - 308 U/L    CK - MB <1.0 <3.6 NG/ML    CK-MB Index Cannot be calulated 0 - 2.5     CBC WITH AUTOMATED DIFF    Collection Time: 07/31/17  5:31 AM   Result Value Ref Range    WBC 2.7 (L) 4.1 - 11.1 K/uL    RBC 4.61 4.10 - 5.70 M/uL    HGB 13.6 12.1 - 17.0 g/dL    HCT 40.4 36.6 - 50.3 %    MCV 87.6 80.0 - 99.0 FL    MCH 29.5 26.0 - 34.0 PG    MCHC 33.7 30.0 - 36.5 g/dL    RDW 13.2 11.5 - 14.5 %    PLATELET 044 758 - 470 K/uL    NEUTROPHILS 40 32 - 75 %    LYMPHOCYTES 46 12 - 49 %    MONOCYTES 9 5 - 13 %    EOSINOPHILS 5 0 - 7 %    BASOPHILS 0 0 - 1 %    ABS. NEUTROPHILS 1.1 (L) 1.8 - 8.0 K/UL    ABS. LYMPHOCYTES 1.2 0.8 - 3.5 K/UL    ABS. MONOCYTES 0.2 0.0 - 1.0 K/UL    ABS. EOSINOPHILS 0.1 0.0 - 0.4 K/UL    ABS.  BASOPHILS 0.0 0.0 - 0.1 K/UL   METABOLIC PANEL, BASIC    Collection Time: 07/31/17  5:31 AM   Result Value Ref Range    Sodium 142 136 - 145 mmol/L    Potassium 3.1 (L) 3.5 - 5.1 mmol/L    Chloride 106 97 - 108 mmol/L    CO2 30 21 - 32 mmol/L    Anion gap 6 5 - 15 mmol/L    Glucose 88 65 - 100 mg/dL    BUN 15 6 - 20 MG/DL    Creatinine 1.37 (H) 0.70 - 1.30 MG/DL    BUN/Creatinine ratio 11 (L) 12 - 20      GFR est AA >60 >60 ml/min/1.73m2    GFR est non-AA 53 (L) >60 ml/min/1.73m2    Calcium 9.1 8.5 - 10.1 MG/DL         CARDIAC DIAGNOSTICS:     Cardiac Evaluation Includes:    EKG 8/15/17: NSR with inverted T waves in V4-V6  EKG 7/31/17: NSR with inverted T waves in V3-V5. ASSESSMENT AND PLAN:     Assessment and Plan:    Atypical Chest pain: no elevated cardiac enzymes, no sig changes on EKG. - Pt non-compliant with ASA, discussed. - Pt non-compliant wit statin, discussed. - Need records from [facility]   - Increasing Toprol XL to 100 mg daily to start tomorrow. - Agree with adding Amlodipine 5 mg today (pt c/o leg swelling at 10  mg). - Adding back Ace I, pt's creatinine is stable and GFR >60.   - ASA to 81, no increased benefit from 325 mg.  - Atorvastatin 20 mg for now, goal to get to 40 mg daily.   - Requesting records from Alex Aceves, his cardiologist.       Resident: Emerick Kras, MD Sharan Severs, MD, [address]  [address], [address] [place] [address]  Ph: [phone]   Ph [phone]      CARDIOLOGY ATTENDING  Patient personally seen and examined. All the elements of history and examination were personally performed. Assessment and plan was discussed and agree as written above    Mr. Nikhil Newell notes a long history of chest pain (1 year +) with a recent normal stress test (April per patient). He had atypical chest pain yesterday last 10 hours.   Despite prolonged symptoms, Mr. Park Grewal has ruled out for MI. He is chest pain free this AM.    Going forward, will optimize meds (restart ASA, statin, increase BB, add CCB) and control BP. If he continues to have chest pain despite this, then can consider a cardiac cath. Will get recent stress test results and other records from his cardiologist.    Mr. Park Grewal has been non-compliant with ASA and statin and I emphasized importance of those meds. He also has PTSD and this may be playing a role in his symptoms and HTN. Will reassess tomorrow and decide on whether we perform cath then or not.      Radha Baez MD, [facility] [place]

## 2017-07-31 NOTE — CONSULTS
David Perez MD. [facility]      Patient: Danuta Jha  : 1960    Today's Date: 2017    HISTORY OF PRESENT ILLNESS:     History of Present Illness:    Danuta Jha is a 59 y.o. male presented to the hospital yesterday for chest pain. He awoke at ~7:30 am with substernal chest pain, non-radiating, throbbing in nature, 6/10 intensity. Took bp on home cuff, reports 191/120. Presented to ER for chest pain, reports it got better with nitro-paste, pain didn't go away until last night. Notes TIA or CVA in April at [facility], started seen a cardiologist at that time, a Dr. Milla Caceres. Notes he had a stress test in April that was \"normal\" and he was to be on 81 mg ASA and 10 mg Lipitor but hasn't taken either of these in the past 1 month.         PAST MEDICAL HISTORY:     Past Medical History:   Diagnosis Date    Cancer [facility])     prostate    Hypertension     PSA elevation     PTSD (post-traumatic stress disorder)     Sleep apnea     not compliant with CPAP    Stroke [facility])     2017       Past Surgical History:   Procedure Laterality Date    MA PROSTATE BIOPSY, NEEDLE, SATURATION SAMPLING      x 2         MEDICATIONS:     Current Facility-Administered Medications   Medication Dose Route Frequency Provider Last Rate Last Dose    sodium chloride (NS) flush 5-10 mL  5-10 mL IntraVENous Q8H Az Benjamin MD   10 mL at 17    sodium chloride (NS) flush 5-10 mL  5-10 mL IntraVENous PRN Az Benjamin MD        heparin (porcine) injection 5,000 Units  5,000 Units SubCUTAneous Ashley Peck MD   5,000 Units at 17 0244    aspirin (ASPIRIN) tablet 325 mg  325 mg Oral DAILY Az Benjamin MD        buPROPion SR (WELLBUTRIN SR) tablet 150 mg  150 mg Oral BID Az Benjamin MD   150 mg at 17 1821    clonazePAM (KlonoPIN) tablet 0.5 mg  0.5 mg Oral QHS Az Benjamin MD   0.5 mg at 17 2221    clonazePAM (KlonoPIN) tablet 2 mg  2 mg Oral DAILY Yoko Winter MD  FLUoxetine (PROzac) capsule 40 mg  40 mg Oral DAILY zA Benjamin MD        metoprolol succinate (TOPROL-XL) XL tablet 50 mg  50 mg Oral DAILY Az Benjamin MD        mirtazapine (REMERON) tablet 15 mg  15 mg Oral QHS Az Benjamin MD   15 mg at 07/30/17 2221    temazepam (RESTORIL) capsule 15 mg  15 mg Oral QHS PRN Az Benjamin MD        traZODone (DESYREL) tablet 50 mg  50 mg Oral QHS Az Benjamin MD   50 mg at 07/30/17 2221    amLODIPine (NORVASC) tablet 5 mg  5 mg Oral DAILY Loreta Car MD           No Known Allergies      SOCIAL HISTORY:     Social History   Substance Use Topics    Smoking status: Never Smoker    Smokeless tobacco: Never Used    Alcohol use No         FAMILY HISTORY:     Family History   Problem Relation Age of Onset    Heart Disease Mother     Diabetes Mother     Hypertension Mother     Cancer Mother     Substance Abuse Sister     Psychiatric Disorder Sister     Heart Disease Sister     Substance Abuse Sister          REVIEW OF SYMPTOMS:     Review of Symptoms:  Constitutional: Negative for fever, chills  HEENT: Negative for nosebleeds, tinnitus, and vision changes. Respiratory: Negative for cough, wheezing  Cardiovascular: Negative for orthopnea, claudication, syncope, and PND. Gastrointestinal: Negative for abdominal pain, diarrhea, melena. Genitourinary: Negative for dysuria  Musculoskeletal: Negative for myalgias. Skin: Negative for rash  Heme: No problems bleeding. Neurological: Negative for speech change and focal weakness. PHYSICAL EXAM:     Physical Exam:  Visit Vitals    /88 (BP 1 Location: Left arm, BP Patient Position: At rest)    Pulse 63    Temp 98.1 °F (36.7 °C)    Resp 16    Ht 5' 6\" (1.676 m)    Wt 169 lb 12.1 oz (77 kg)    SpO2 96%    BMI 27.4 kg/m2     Patient appears generally well, mood and affect are appropriate and pleasant. HEENT:  Hearing intact, non-icteric, normocephalic, atraumatic.    Neck Exam: Supple or carotid bruits. Lung Exam: Clear to auscultation, even breath sounds. Cardiac Exam: Regular rate and rhythm with no murmur  Abdomen: Soft, non-tender, normal bowel sounds. No bruits or masses. Extremities: Moves all ext well. No lower extremity edema. Vascular: 2+ dorsalis pedis pulses bilaterally. Psych: Appropriate affect  Neuro - Grossly intact      LABS / OTHER STUDIES:     Recent Results (from the past 24 hour(s))   CBC WITH AUTOMATED DIFF    Collection Time: 07/30/17  2:33 PM   Result Value Ref Range    WBC 3.4 (L) 4.1 - 11.1 K/uL    RBC 4.27 4. 10 - 5.70 M/uL    HGB 12.7 12.1 - 17.0 g/dL    HCT 36.9 36.6 - 50.3 %    MCV 86.4 80.0 - 99.0 FL    MCH 29.7 26.0 - 34.0 PG    MCHC 34.4 30.0 - 36.5 g/dL    RDW 13.2 11.5 - 14.5 %    PLATELET 476 700 - 258 K/uL    NEUTROPHILS 59 32 - 75 %    LYMPHOCYTES 32 12 - 49 %    MONOCYTES 7 5 - 13 %    EOSINOPHILS 2 0 - 7 %    BASOPHILS 0 0 - 1 %    ABS. NEUTROPHILS 2.0 1.8 - 8.0 K/UL    ABS. LYMPHOCYTES 1.1 0.8 - 3.5 K/UL    ABS. MONOCYTES 0.2 0.0 - 1.0 K/UL    ABS. EOSINOPHILS 0.1 0.0 - 0.4 K/UL    ABS. BASOPHILS 0.0 0.0 - 0.1 K/UL   METABOLIC PANEL, COMPREHENSIVE    Collection Time: 07/30/17  2:33 PM   Result Value Ref Range    Sodium 142 136 - 145 mmol/L    Potassium 3.1 (L) 3.5 - 5.1 mmol/L    Chloride 103 97 - 108 mmol/L    CO2 32 21 - 32 mmol/L    Anion gap 7 5 - 15 mmol/L    Glucose 113 (H) 65 - 100 mg/dL    BUN 20 6 - 20 MG/DL    Creatinine 1.50 (H) 0.70 - 1.30 MG/DL    BUN/Creatinine ratio 13 12 - 20      GFR est AA 58 (L) >60 ml/min/1.73m2    GFR est non-AA 48 (L) >60 ml/min/1.73m2    Calcium 8.6 8.5 - 10.1 MG/DL    Bilirubin, total 0.2 0.2 - 1.0 MG/DL    ALT (SGPT) 53 12 - 78 U/L    AST (SGOT) 30 15 - 37 U/L    Alk.  phosphatase 127 (H) 45 - 117 U/L    Protein, total 7.1 6.4 - 8.2 g/dL    Albumin 3.5 3.5 - 5.0 g/dL    Globulin 3.6 2.0 - 4.0 g/dL    A-G Ratio 1.0 (L) 1.1 - 2.2     TROPONIN I    Collection Time: 07/30/17  2:33 PM   Result Value Ref Range Troponin-I, Qt. <0.04 <0.05 ng/mL   TROPONIN I    Collection Time: 07/30/17 10:19 PM   Result Value Ref Range    Troponin-I, Qt. <0.04 <0.05 ng/mL   CK W/ CKMB & INDEX    Collection Time: 07/30/17 10:19 PM   Result Value Ref Range     39 - 308 U/L    CK - MB <1.0 <3.6 NG/ML    CK-MB Index Cannot be calulated 0 - 2.5     CBC WITH AUTOMATED DIFF    Collection Time: 07/31/17  5:31 AM   Result Value Ref Range    WBC 2.7 (L) 4.1 - 11.1 K/uL    RBC 4.61 4.10 - 5.70 M/uL    HGB 13.6 12.1 - 17.0 g/dL    HCT 40.4 36.6 - 50.3 %    MCV 87.6 80.0 - 99.0 FL    MCH 29.5 26.0 - 34.0 PG    MCHC 33.7 30.0 - 36.5 g/dL    RDW 13.2 11.5 - 14.5 %    PLATELET 129 080 - 472 K/uL    NEUTROPHILS 40 32 - 75 %    LYMPHOCYTES 46 12 - 49 %    MONOCYTES 9 5 - 13 %    EOSINOPHILS 5 0 - 7 %    BASOPHILS 0 0 - 1 %    ABS. NEUTROPHILS 1.1 (L) 1.8 - 8.0 K/UL    ABS. LYMPHOCYTES 1.2 0.8 - 3.5 K/UL    ABS. MONOCYTES 0.2 0.0 - 1.0 K/UL    ABS. EOSINOPHILS 0.1 0.0 - 0.4 K/UL    ABS. BASOPHILS 0.0 0.0 - 0.1 K/UL   METABOLIC PANEL, BASIC    Collection Time: 07/31/17  5:31 AM   Result Value Ref Range    Sodium 142 136 - 145 mmol/L    Potassium 3.1 (L) 3.5 - 5.1 mmol/L    Chloride 106 97 - 108 mmol/L    CO2 30 21 - 32 mmol/L    Anion gap 6 5 - 15 mmol/L    Glucose 88 65 - 100 mg/dL    BUN 15 6 - 20 MG/DL    Creatinine 1.37 (H) 0.70 - 1.30 MG/DL    BUN/Creatinine ratio 11 (L) 12 - 20      GFR est AA >60 >60 ml/min/1.73m2    GFR est non-AA 53 (L) >60 ml/min/1.73m2    Calcium 9.1 8.5 - 10.1 MG/DL         CARDIAC DIAGNOSTICS:     Cardiac Evaluation Includes:    EKG 8/15/17: NSR with inverted T waves in V4-V6  EKG 7/31/17: NSR with inverted T waves in V3-V5. ASSESSMENT AND PLAN:     Assessment and Plan:    Chest pain: no elevated cardiac enzymes, no sig changes on EKG. - Pt non-compliant with ASA, discussed. - Pt non-compliant wit statin, discussed. - Need records from [facility]   - Increasing Toprol XL to 100 mg daily to start tomorrow. - Agree with adding Amlodipine 5 mg today (pt c/o leg swelling at 10  mg). - Adding back Ace I, pt's creatinine is stable and GFR >60.   - ASA to 81, no increased benefit from 325 mg.  - Atorvastatin 20 mg for now, goal to get to 40 mg daily.   - Requesting records from Brisa Harris, his cardiologist.       Resident: MD Courtney Kulkarni MD, [address], [address]  [address] [place] [address].  [address]  Ph: [phone]   Ph [phone]

## 2017-07-31 NOTE — PROGRESS NOTES
1:01 PM  Obs letters signed and explained, on pt's chart. Thanks RITO Don       I met with the pt and pt's wife Yandy Lin [phone] discussed role of case management. Pt lives in a three story home. He drives and is independent with all of his ADLs. Pt has prescription coverage under his insurance plan, he gets his prescriptions filled at [facility], [facility] or [facility]. Pt's PCP - he sees the PA at [facility] - Dr. Carol Martin. DME - pt does not have any equipment at this time. Pt has never had home health before. No other issues or concerns at this time. Thanks RITO Don   Care Management Interventions  PCP Verified by CM:  Yes  Calista Signup: No  Discharge Durable Medical Equipment: No  Physical Therapy Consult: No  Occupational Therapy Consult: No  Speech Therapy Consult: No  Current Support Network: Lives with Spouse  Confirm Follow Up Transport: Family  Plan discussed with Pt/Family/Caregiver: Yes  Discharge Location  Discharge Placement: Home

## 2017-08-01 VITALS
HEIGHT: 66 IN | HEART RATE: 81 BPM | OXYGEN SATURATION: 94 % | WEIGHT: 165 LBS | TEMPERATURE: 98.1 F | SYSTOLIC BLOOD PRESSURE: 138 MMHG | RESPIRATION RATE: 18 BRPM | DIASTOLIC BLOOD PRESSURE: 73 MMHG | BODY MASS INDEX: 26.52 KG/M2

## 2017-08-01 LAB
ANION GAP BLD CALC-SCNC: 6 MMOL/L (ref 5–15)
BASOPHILS # BLD AUTO: 0 K/UL (ref 0–0.1)
BASOPHILS # BLD: 0 % (ref 0–1)
BUN SERPL-MCNC: 15 MG/DL (ref 6–20)
BUN/CREAT SERPL: 11 (ref 12–20)
CALCIUM SERPL-MCNC: 9.5 MG/DL (ref 8.5–10.1)
CHLORIDE SERPL-SCNC: 107 MMOL/L (ref 97–108)
CK MB CFR SERPL CALC: NORMAL % (ref 0–2.5)
CK MB SERPL-MCNC: <1 NG/ML (ref 5–25)
CK SERPL-CCNC: 99 U/L (ref 39–308)
CO2 SERPL-SCNC: 29 MMOL/L (ref 21–32)
CREAT SERPL-MCNC: 1.38 MG/DL (ref 0.7–1.3)
EOSINOPHIL # BLD: 0.1 K/UL (ref 0–0.4)
EOSINOPHIL NFR BLD: 1 % (ref 0–7)
ERYTHROCYTE [DISTWIDTH] IN BLOOD BY AUTOMATED COUNT: 13.3 % (ref 11.5–14.5)
GLUCOSE SERPL-MCNC: 122 MG/DL (ref 65–100)
HCT VFR BLD AUTO: 42.8 % (ref 36.6–50.3)
HGB BLD-MCNC: 14.6 G/DL (ref 12.1–17)
LYMPHOCYTES # BLD AUTO: 8 % (ref 12–49)
LYMPHOCYTES # BLD: 0.7 K/UL (ref 0.8–3.5)
MAGNESIUM SERPL-MCNC: 2.2 MG/DL (ref 1.6–2.4)
MCH RBC QN AUTO: 29.8 PG (ref 26–34)
MCHC RBC AUTO-ENTMCNC: 34.1 G/DL (ref 30–36.5)
MCV RBC AUTO: 87.3 FL (ref 80–99)
MONOCYTES # BLD: 0.2 K/UL (ref 0–1)
MONOCYTES NFR BLD AUTO: 2 % (ref 5–13)
NEUTS SEG # BLD: 7.2 K/UL (ref 1.8–8)
NEUTS SEG NFR BLD AUTO: 89 % (ref 32–75)
PLATELET # BLD AUTO: 190 K/UL (ref 150–400)
POTASSIUM SERPL-SCNC: 4.7 MMOL/L (ref 3.5–5.1)
RBC # BLD AUTO: 4.9 M/UL (ref 4.1–5.7)
RBC MORPH BLD: ABNORMAL
SODIUM SERPL-SCNC: 142 MMOL/L (ref 136–145)
WBC # BLD AUTO: 8.2 K/UL (ref 4.1–11.1)

## 2017-08-01 PROCEDURE — 83735 ASSAY OF MAGNESIUM: CPT | Performed by: INTERNAL MEDICINE

## 2017-08-01 PROCEDURE — 74011250637 HC RX REV CODE- 250/637: Performed by: INTERNAL MEDICINE

## 2017-08-01 PROCEDURE — 99218 HC RM OBSERVATION: CPT

## 2017-08-01 PROCEDURE — 80048 BASIC METABOLIC PNL TOTAL CA: CPT | Performed by: INTERNAL MEDICINE

## 2017-08-01 PROCEDURE — 82550 ASSAY OF CK (CPK): CPT | Performed by: INTERNAL MEDICINE

## 2017-08-01 PROCEDURE — 36415 COLL VENOUS BLD VENIPUNCTURE: CPT | Performed by: INTERNAL MEDICINE

## 2017-08-01 PROCEDURE — 74011250637 HC RX REV CODE- 250/637: Performed by: SPECIALIST

## 2017-08-01 PROCEDURE — 85025 COMPLETE CBC W/AUTO DIFF WBC: CPT | Performed by: INTERNAL MEDICINE

## 2017-08-01 PROCEDURE — 74011250636 HC RX REV CODE- 250/636: Performed by: INTERNAL MEDICINE

## 2017-08-01 RX ORDER — LISINOPRIL 20 MG/1
20 TABLET ORAL DAILY
Qty: 30 TAB | Refills: 0 | Status: SHIPPED | OUTPATIENT
Start: 2017-08-01 | End: 2019-01-14

## 2017-08-01 RX ORDER — AMLODIPINE BESYLATE 5 MG/1
5 TABLET ORAL DAILY
Qty: 30 TAB | Refills: 0 | Status: SHIPPED | OUTPATIENT
Start: 2017-08-01 | End: 2019-01-14

## 2017-08-01 RX ORDER — ATORVASTATIN CALCIUM 20 MG/1
20 TABLET, FILM COATED ORAL
Qty: 30 TAB | Refills: 0 | Status: ON HOLD | OUTPATIENT
Start: 2017-08-01 | End: 2019-01-23 | Stop reason: DRUGHIGH

## 2017-08-01 RX ORDER — METOPROLOL SUCCINATE 100 MG/1
100 TABLET, EXTENDED RELEASE ORAL DAILY
Qty: 30 TAB | Refills: 0 | Status: SHIPPED | OUTPATIENT
Start: 2017-08-01

## 2017-08-01 RX ORDER — GUAIFENESIN 100 MG/5ML
81 LIQUID (ML) ORAL DAILY
Qty: 30 TAB | Refills: 0 | Status: SHIPPED | OUTPATIENT
Start: 2017-08-01 | End: 2019-01-23

## 2017-08-01 RX ORDER — CLONIDINE HYDROCHLORIDE 0.1 MG/1
0.1 TABLET ORAL AS NEEDED
Status: DISCONTINUED | OUTPATIENT
Start: 2017-08-01 | End: 2017-08-01 | Stop reason: HOSPADM

## 2017-08-01 RX ORDER — CLONIDINE HYDROCHLORIDE 0.1 MG/1
0.1 TABLET ORAL AS NEEDED
Qty: 30 TAB | Refills: 0 | Status: SHIPPED | OUTPATIENT
Start: 2017-08-01

## 2017-08-01 RX ADMIN — METOPROLOL SUCCINATE 100 MG: 50 TABLET, FILM COATED, EXTENDED RELEASE ORAL at 09:07

## 2017-08-01 RX ADMIN — AMLODIPINE BESYLATE 5 MG: 5 TABLET ORAL at 09:06

## 2017-08-01 RX ADMIN — Medication 10 ML: at 04:16

## 2017-08-01 RX ADMIN — LISINOPRIL 20 MG: 20 TABLET ORAL at 09:06

## 2017-08-01 RX ADMIN — FLUOXETINE HYDROCHLORIDE 40 MG: 20 CAPSULE ORAL at 09:06

## 2017-08-01 RX ADMIN — HEPARIN SODIUM 5000 UNITS: 5000 INJECTION, SOLUTION INTRAVENOUS; SUBCUTANEOUS at 04:15

## 2017-08-01 RX ADMIN — BUPROPION HYDROCHLORIDE 150 MG: 150 TABLET, EXTENDED RELEASE ORAL at 09:06

## 2017-08-01 RX ADMIN — CLONAZEPAM 2 MG: 1 TABLET ORAL at 09:06

## 2017-08-01 RX ADMIN — ASPIRIN 81 MG 81 MG: 81 TABLET ORAL at 09:05

## 2017-08-01 NOTE — PROGRESS NOTES
Cardiology Progress Note       [facility]  NAME:  Ameya Alcazar   :   1960   MRN:   [id number]     Assessment/Plan:     Atypical Chest pain and HTN :   - No evidence of ACS. Troponins negative, EKG non ischemic.  - cont toprol  mg every day   - cont  Amlodipine 5 mg today (pt c/o leg swelling at 10  mg). - resumed  ACE-I  pt's creatinine is stable and GFR >60.   - cont asa 81 mg every day   - cont statin  - Op follow up Dr Charlotte Sheth today. CARDIOLOGY ATTENDING  Patient personally seen and examined. All the elements of history and examination were personally performed. Assessment and plan was discussed and agree as written above    Mr. Digna Wu had some diarrhea and upset stomach overnight, but denies any cardiac complaints since he got here. No CP. BP is a little better after increasing his Toprol XL and adding back amlodipine. Also reinforced use of ASA and statin which he was non-compliant with on admission. Regarding his chest pain complaints, patient says recent stress test was normal.  He ruled out for MI and had a non-ischemic EKG. If he has recurrent chest pain despite optimal meds, then could consider a cardiac cath - patient will see his cardiologist (Dr. Desirae Montgomery) to follow outpatient. Mr. Digna Wu is OK with plan. D/W Dr. Zoie Garzon as well. Nory Tovar MD, [facility]              Subjective:   Ameya Alcazar is a 59 y.o. male presented to the hospital  for chest pain. He awoke at ~7:30 am with substernal chest pain, non-radiating, throbbing in nature, 6/10 intensity. Took bp on home cuff, reports 191/120. Presented to ER for chest pain, reports it got better with nitro-paste, pain didn't go away until last night. Notes TIA or CVA in April at [facility], started seen a cardiologist at that time, a Dr. Desirae Montgomery.  Notes he had a stress test in April that was \"normal\" and he was to be on 81 mg ASA and 10 mg Lipitor but hasn't taken either of these in the past 1 month.           Cardiac ROS: Patient denies any exertional chest pain, dyspnea, palpitations, syncope, orthopnea, edema or paroxysmal nocturnal dyspnea. Previous Cardiac Eval:  17 carotids: mod torturosity distal NIMO,Left: mild diffuse initmal thickening w/o focal plpaque formation    ECHO: EF 60-65% no shunt  17 MRI head: chronic white matter dse    Review of Systems: No nausea, indigestion, vomiting, pain, cough, sputum. No bleeding. Taking po. Objective:     Visit Vitals    /84 (BP 1 Location: Right arm, BP Patient Position: At rest)    Pulse 79    Temp 98.4 °F (36.9 °C)    Resp 16    Ht 5' 6\" (1.676 m)    Wt 165 lb (74.8 kg)    SpO2 98%    BMI 26.63 kg/m2      O2 Device: Room air    Temp (24hrs), Av.7 °F (37.1 °C), Min:98.4 °F (36.9 °C), Max:99.2 °F (37.3 °C)      701 - 1900  In: -   Out: 725 [Urine:725]    1901 -  0700  In: 1070 [P.O.:1070]  Out: 1100 [Urine:1100]  TELE:  SR     General: AAOx3 cooperative, no acute distress. HEENT: Atraumatic. Pink and moist.  Anicteric sclerae. Neck : Supple, no thyromegaly. Lungs: CTA bilaterally. No wheezing/rhonchi/rales. Heart: Regular rhythm, no murmur, no rubs, no gallops. No JVD. No carotid bruits. Abdomen: Soft, non-distended, non-tender. + Bowel sounds. No bruits. Extremities: No edema, no clubbing, no cyanosis. No calf tenderness  Neurologic: Grossly intact. Alert and oriented X 3. No acute neurological distress. Psych: Good insight. Not anxious or agitated.         Care Plan discussed with:    Comments   Patient x    Family      RN x    Care Manager                    Consultant:  x        Data Review:     No lab exists for component: ITNL   Recent Labs      17   0134  17   2219  17   1433   CPK  99  103   --    CKMB  <1.0  <1.0   --    TROIQ   --   <0.04  <0.04     Recent Labs      17   0134  17   0531  17   1433   NA  142  142  142   K  4.7 3. 1*  3.1*   CL  107  106  103   CO2  29  30  32   BUN  15  15  20   CREA  1.38*  1.37*  1.50*   GLU  122*  88  113*   MG  2.2   --    --    ALB   --    --   3.5   WBC  8.2  2.7*  3.4*   HGB  14.6  13.6  12.7   HCT  42.8  40.4  36.9   PLT  190  247  261     No results for input(s): INR, PTP, APTT in the last 72 hours.     No lab exists for component: INREXT    Medications reviewed  Current Facility-Administered Medications   Medication Dose Route Frequency    cloNIDine HCl (CATAPRES) tablet 0.1 mg  0.1 mg Oral PRN    lisinopril (PRINIVIL, ZESTRIL) tablet 20 mg  20 mg Oral DAILY    metoprolol succinate (TOPROL-XL) XL tablet 100 mg  100 mg Oral DAILY    aspirin chewable tablet 81 mg  81 mg Oral DAILY    atorvastatin (LIPITOR) tablet 20 mg  20 mg Oral QHS    ondansetron (ZOFRAN) injection 4 mg  4 mg IntraVENous Q8H PRN    sodium chloride (NS) flush 5-10 mL  5-10 mL IntraVENous Q8H    sodium chloride (NS) flush 5-10 mL  5-10 mL IntraVENous PRN    heparin (porcine) injection 5,000 Units  5,000 Units SubCUTAneous Q8H    buPROPion SR (WELLBUTRIN SR) tablet 150 mg  150 mg Oral BID    clonazePAM (KlonoPIN) tablet 0.5 mg  0.5 mg Oral QHS    clonazePAM (KlonoPIN) tablet 2 mg  2 mg Oral DAILY    FLUoxetine (PROzac) capsule 40 mg  40 mg Oral DAILY    mirtazapine (REMERON) tablet 15 mg  15 mg Oral QHS    temazepam (RESTORIL) capsule 15 mg  15 mg Oral QHS PRN    traZODone (DESYREL) tablet 50 mg  50 mg Oral QHS    amLODIPine (NORVASC) tablet 5 mg  5 mg Oral DAILY         Ana Daniel NP

## 2017-08-01 NOTE — DISCHARGE SUMMARY
Physician Discharge Summary     Patient ID:  Riddhi Jackson  [id number]  59 y.o.  1960    Admit date: 7/30/2017    Discharge date: 8/1/2017    Admission Diagnoses: Chest pain  Chest pain    Principal Discharge Diagnoses:    Chest pain    OTHER PROBLEMS ADDRESSEDS  Principal Diagnosis <principal problem not specified>                                            Active Problems:    Prostate cancer ([facility] (8/22/2016)      Chest pain (7/30/2017)      CKD (chronic kidney disease) stage 3, GFR 30-59 ml/min (7/30/2017)      MELLY (obstructive sleep apnea) (7/30/2017)      HTN (hypertension) (7/30/2017)      PTSD (post-traumatic stress disorder) (7/30/2017)      Hypokalemia (7/30/2017)       Patient Active Problem List   Diagnosis Code    Prostate cancer ([facility] C61    Chest pain R07.9    CKD (chronic kidney disease) stage 3, GFR 30-59 ml/min N18.3    MELLY (obstructive sleep apnea) G47.33    HTN (hypertension) I10    PTSD (post-traumatic stress disorder) F43.10    Hypokalemia E87.6         Hospital Course:   Mr. Radha Knott is a 58 yo AAM w/ hx of HTN, CKD, PTSD, MELLY p/w atypical CP    Chest pain (): atypical, resolved. No hypoxia or tachycardia to suggest PE. EKG and telemetry non ischemic. Troponin neg. Had nonetheless not been compliant with home meds. Seen by cardiology and discussed with Dr Meera Del Toros, cleared for d/c today as his BP is better today. Echo showed preserved EF with no RWMA. Continue ASA, metoprolol, ACEi. PCP and cardiology close follow up       HTN (hypertension) (7/30/2017): BP better controlled today. Con metoprolol, lisinopril and amlodipine. Cardiology also added clonidine PRN. Discussed with patient     Acute on CKD (chronic kidney disease) stage 3, GFR 30-59 ml/min (7/30/2017): Cr stable and likely at baseline now. Outpatient follow up      PTSD (post-traumatic stress disorder) (7/30/2017): this may be contributing to his sensation of chest pain?  Continue Wellbutrin, Klonopin, Prozac and Remeron     Prostate cancer ([facility] (8/22/2016) s/p post prostatectomy. Stable. Continue follow up with Dr Shanda Medel urology/      MELLY (obstructive sleep apnea) (7/30/2017). Not using CPAP. Should follow up with sleep medicine / PCP    Pt discharged in improved and stable condition. Procedures performed: see above    Imaging studies:   CXR - No acute process or change compared to the prior exam      PCP: Kalyani Munoz, MD    Consults: Cardiology    Discharge Exam:  Patient Vitals for the past 12 hrs:   Temp Pulse Resp BP SpO2   08/01/17 0803 98.4 °F (36.9 °C) 79 16 166/84 98 %   08/01/17 0415 98.4 °F (36.9 °C) 79 18 124/75 98 %   08/01/17 0015 99.2 °F (37.3 °C) 86 16 122/69 96 %   07/31/17 2349 - 86 - - -       GEN: NAD  CV: RRR, no MRG  RESP: CTAB  ABD: NTTP      Disposition: home    Patient Instructions:   Current Discharge Medication List      START taking these medications    Details   amLODIPine (NORVASC) 5 mg tablet Take 1 Tab by mouth daily. Qty: 30 Tab, Refills: 0      aspirin 81 mg chewable tablet Take 1 Tab by mouth daily. Qty: 30 Tab, Refills: 0      atorvastatin (LIPITOR) 20 mg tablet Take 1 Tab by mouth nightly. Qty: 30 Tab, Refills: 0      lisinopril (PRINIVIL, ZESTRIL) 20 mg tablet Take 1 Tab by mouth daily. Qty: 30 Tab, Refills: 0      cloNIDine HCl (CATAPRES) 0.1 mg tablet Take 1 Tab by mouth as needed (as needed for BP>170/100). Qty: 30 Tab, Refills: 0         CONTINUE these medications which have CHANGED    Details   metoprolol succinate (TOPROL-XL) 100 mg tablet Take 1 Tab by mouth daily. Qty: 30 Tab, Refills: 0         CONTINUE these medications which have NOT CHANGED    Details   FLUoxetine (PROZAC) 40 mg capsule Take 40 mg by mouth daily. buPROPion SR (WELLBUTRIN SR) 150 mg SR tablet Take 150 mg by mouth two (2) times a day. !! clonazePAM (KLONOPIN) 2 mg tablet Take 2 mg by mouth daily. mirtazapine (REMERON) 15 mg tablet Take 15 mg by mouth nightly as needed.       temazepam (RESTORIL) 15 mg capsule Take 15 mg by mouth nightly as needed for Sleep.      traZODone (DESYREL) 50 mg tablet Take 50 mg by mouth nightly. !! clonazePAM (KLONOPIN) 0.5 mg tablet Take 0.5 mg by mouth nightly. !! - Potential duplicate medications found. Please discuss with provider. STOP taking these medications       lisinopril-hydroCHLOROthiazide (PRINZIDE, ZESTORETIC) 20-12.5 mg per tablet Comments:   Reason for Stopping:               Activity: See discharge instructions  Diet: See discharge instructions  Wound Care: See discharge instructions    Follow-up Information     Follow up With Details Comments Yoseph Giang MD In 1 week  [address] - [facility] Cardiology  [facility]  [phone]      Follow up with your primary care doctor in 1 week       Phys MD Tammy   Patient can only remember the practice name and not the physician            I spent 35 minutes on this discharge.     Signed:  Nova George MD  8/1/2017  7:21 AM

## 2017-08-01 NOTE — PROGRESS NOTES
Shift Summary  8/1/2017  [id number]    Pt awake at time of bedside shift report received from [facility]. AM vitals, assessment, and meds complete without difficulty, AM rhythm strip shows NSR. Reviewed today's plan of care and goals with patient/family; plan of care today includes possible discharge after cardiology input. Pt has been NPO since midnight for possible cardiac cath. 0900 Pt is aware he will not be going for cardiac cath, diet re-started by Dr. Perez Prior. Pt ready for discharge. Discharge responsibilities turned over to [facility]. Pt wheeled off of unit by volunteer escort at [phone] to meet private vehicle.

## 2017-08-01 NOTE — PROGRESS NOTES
File reviewed for pending discharge. Quick disclosure and discharge caregiver complete. Care plan and education complete. No security tag in file indicating possessions secured. 5 new medications and medication information sheets added to AVS.  Times of last medications listed on AVS.  CM complete. No HH. Family to transport. No Oxygen needs for transport. No medications in pyxis for patient at discharge. Cardiology has signed off.      1230  Met with patient and family. Discussed discharge paperwork and all new medications. Answered questions and provided information on kidney function and depression medications. Initially, patient blood work indicated possible GFR<60 from high blood pressure response. Explained current function within normal limits. All questions answered. Discharge complete.

## 2017-08-01 NOTE — DISCHARGE INSTRUCTIONS
HOSPITALIST DISCHARGE INSTRUCTIONS  NAME: Salo Dickson   :  1960   MRN:  [id number]     Date/Time:  2017 7:20 AM    ADMIT DATE: 2017     DISCHARGE DATE: 2017     PRINCIPAL DISCHARGE DIAGNOSES:  High blood pressure  Chest pain    MEDICATIONS:  · It is important that you take the medication exactly as they are prescribed. Note the changes and additions to your medications. Be sure you understand these changes before you are discharged today. · Keep your medication in the bottles provided by the pharmacist and keep a list of the medication names, dosages, and times to be taken in your wallet. · Do not take other medications without consulting your doctor. Pain Management: per above medications    What to do at Home    Recommended diet:  Cardiac Diet    Recommended activity: Activity as tolerated    If you experience any of the following symptoms then please call your primary care physician or return to the emergency room if you cannot get hold of your doctor:  severe chest pain, shortness of breath, dizziness, headache or other severe concerning symptoms that brought you to the hospital in the first place    Follow Up: Follow-up Information     Follow up With Details Comments Yoseph Giang MD In 1 week  [address] [place] [address] - [facility] Cardiology  [facility]  [phone]      Follow up with your primary care doctor in 1 week               Information obtained by :  I understand that if any problems occur once I am at home I am to contact my physician. I understand and acknowledge receipt of the instructions indicated above.                                                                                                                                            Physician's or R.N.'s Signature                                                                  Date/Time Patient or Representative Signature                                                          Date/Time       Acute High Blood Pressure: Care Instructions  Your Care Instructions  Acute high blood pressure is very high blood pressure. It's a serious problem. Very high blood pressure can damage your brain, heart, eyes, and kidneys. You may have been given medicines to lower your blood pressure. You may have gotten them as pills or through a needle in one of your veins. This is called an IV. And maybe you were given other medicines too. These can be needed when high blood pressure causes other problems. To keep your blood pressure at a lower level, you may need to make healthy lifestyle changes. And you will probably need to take medicines. Be sure to follow up with your doctor about your blood pressure and what you can do about it. Follow-up care is a key part of your treatment and safety. Be sure to make and go to all appointments, and call your doctor if you are having problems. It's also a good idea to know your test results and keep a list of the medicines you take. How can you care for yourself at home? · See your doctor as often as he or she recommends. This is to make sure your blood pressure is under control. You will probably go at least 2 times a year. But it may be more often at first.  · Take your blood pressure medicine exactly as prescribed. You may take one or more types. They include diuretics, beta-blockers, ACE inhibitors, calcium channel blockers, and angiotensin II receptor blockers. Call your doctor if you think you are having a problem with your medicine. · If you take blood pressure medicine, talk to your doctor before you take decongestants or anti-inflammatory medicine, such as ibuprofen. These can raise blood pressure. · Learn how to check your blood pressure at home. Check it often.   · Ask your doctor if it's okay to drink alcohol. · Talk to your doctor about lifestyle changes that can help blood pressure. These include being active and not smoking. When should you call for help? Call 911 anytime you think you may need emergency care. This may mean having symptoms that suggest that your blood pressure is causing a serious heart or blood vessel problem. Your blood pressure may be over 180/110. For example, call 911 if:  · You have symptoms of a heart attack. These may include:  ¨ Chest pain or pressure, or a strange feeling in the chest.  ¨ Sweating. ¨ Shortness of breath. ¨ Nausea or vomiting. ¨ Pain, pressure, or a strange feeling in the back, neck, jaw, or upper belly or in one or both shoulders or arms. ¨ Lightheadedness or sudden weakness. ¨ A fast or irregular heartbeat. · You have symptoms of a stroke. These may include:  ¨ Sudden numbness, tingling, weakness, or loss of movement in your face, arm, or leg, especially on only one side of your body. ¨ Sudden vision changes. ¨ Sudden trouble speaking. ¨ Sudden confusion or trouble understanding simple statements. ¨ Sudden problems with walking or balance. ¨ A sudden, severe headache that is different from past headaches. · You have severe back or belly pain. Do not wait until your blood pressure comes down on its own. Get help right away. Call your doctor now or seek immediate care if:  · Your blood pressure is much higher than normal (such as 180/110 or higher), but you don't have symptoms. · You think high blood pressure is causing symptoms, such as:  ¨ Severe headache. ¨ Blurry vision. Watch closely for changes in your health, and be sure to contact your doctor if:  · Your blood pressure measures 140/90 or higher at least 2 times. That means the top number is 140 or higher or the bottom number is 90 or higher, or both. · You think you may be having side effects from your blood pressure medicine.   · Your blood pressure is usually normal, but it goes above normal at least 2 times. Where can you learn more? Go to http://jailene-paula.info/. Enter Q209 in the search box to learn more about \"Acute High Blood Pressure: Care Instructions. \"  Current as of: August 8, 2016  Content Version: 11.3  © 5188-5675 [employer]. Care instructions adapted under license by [employer] (which disclaims liability or warranty for this information). If you have questions about a medical condition or this instruction, always ask your healthcare professional. [address] any warranty or liability for your use of this information.

## 2017-08-01 NOTE — PROGRESS NOTES
Bedside and Verbal shift change report given to Wilmer Pratt RN (oncoming nurse) by Prasad Hudson RN (offgoing nurse).  Report included the following information SBAR, Kardex, Intake/Output, MAR, Recent Results and Cardiac Rhythm SR.

## 2018-03-29 ENCOUNTER — HOSPITAL ENCOUNTER (EMERGENCY)
Age: 58
Discharge: HOME | End: 2018-03-29
Attending: EMERGENCY MEDICINE
Payer: OTHER GOVERNMENT

## 2018-03-29 VITALS
HEART RATE: 78 BPM | DIASTOLIC BLOOD PRESSURE: 70 MMHG | HEIGHT: 66 IN | WEIGHT: 170 LBS | BODY MASS INDEX: 27.32 KG/M2 | OXYGEN SATURATION: 95 % | SYSTOLIC BLOOD PRESSURE: 140 MMHG | RESPIRATION RATE: 16 BRPM | TEMPERATURE: 99 F

## 2018-03-29 DIAGNOSIS — S80.812A ABRASION OF LEFT LOWER LEG, INITIAL ENCOUNTER: ICD-10-CM

## 2018-03-29 DIAGNOSIS — S80.12XA CONTUSION OF LEFT KNEE AND LOWER LEG, INITIAL ENCOUNTER: Primary | ICD-10-CM

## 2018-03-29 DIAGNOSIS — S80.02XA CONTUSION OF LEFT KNEE AND LOWER LEG, INITIAL ENCOUNTER: Primary | ICD-10-CM

## 2018-03-29 PROCEDURE — 99283 EMERGENCY DEPT VISIT LOW MDM: CPT

## 2018-03-29 PROCEDURE — 74011000250 HC RX REV CODE- 250: Performed by: PHYSICIAN ASSISTANT

## 2018-03-29 RX ORDER — BACITRACIN 500 UNIT/G
1 PACKET (EA) TOPICAL
Status: COMPLETED | OUTPATIENT
Start: 2018-03-29 | End: 2018-03-29

## 2018-03-29 RX ADMIN — BACITRACIN 1 PACKET: 500 OINTMENT TOPICAL at 22:52

## 2018-03-30 NOTE — DISCHARGE INSTRUCTIONS
Bruises: Care Instructions  Your Care Instructions    Bruises occur when small blood vessels under the skin tear or rupture, most often from a twist, bump, or fall. Blood leaks into tissues under the skin and causes a black-and-blue spot that often turns colors, including purplish black, reddish blue, or yellowish green, as the bruise heals. Bruises hurt, but most are not serious and will go away on their own within 2 to 4 weeks. Sometimes, gravity causes them to spread down the body. A leg bruise usually will take longer to heal than a bruise on the face or arms. Follow-up care is a key part of your treatment and safety. Be sure to make and go to all appointments, and call your doctor if you are having problems. It's also a good idea to know your test results and keep a list of the medicines you take. How can you care for yourself at home? · Take pain medicines exactly as directed. ¨ If the doctor gave you a prescription medicine for pain, take it as prescribed. ¨ If you are not taking a prescription pain medicine, ask your doctor if you can take an over-the-counter medicine. · Put ice or a cold pack on the area for 10 to 20 minutes at a time. Put a thin cloth between the ice and your skin. · If you can, prop up the bruised area on pillows as much as possible for the next few days. Try to keep the bruise above the level of your heart. When should you call for help? Call your doctor now or seek immediate medical care if:  ? · You have signs of infection, such as:  ¨ Increased pain, swelling, warmth, or redness. ¨ Red streaks leading from the bruise. ¨ Pus draining from the bruise. ¨ A fever. ? · You have a bruise on your leg and signs of a blood clot, such as:  ¨ Increasing redness and swelling along with warmth, tenderness, and pain in the bruised area. ¨ Pain in your calf, back of the knee, thigh, or groin. ¨ Redness and swelling in your leg or groin. ? · Your pain gets worse. ? Watch closely for changes in your health, and be sure to contact your doctor if:  ? · You do not get better as expected. Where can you learn more? Go to http://jailene-paula.info/. Enter [phone] in the search box to learn more about \"Bruises: Care Instructions. \"  Current as of: March 20, 2017  Content Version: 11.4  © 0936-2453 [employer]. Care instructions adapted under license by [employer] (which disclaims liability or warranty for this information). If you have questions about a medical condition or this instruction, always ask your healthcare professional. [place] [address] any warranty or liability for your use of this information. Scrapes (Abrasions): Care Instructions  Your Care Instructions  Scrapes (abrasions) are wounds where your skin has been rubbed or torn off. Most scrapes do not go deep into the skin, but some may remove several layers of skin. Scrapes usually don't bleed much, but they may ooze pinkish fluid. Scrapes on the head or face may appear worse than they are. They may bleed a lot because of the good blood supply to this area. Most scrapes heal well and may not need a bandage. They usually heal within 3 to 7 days. A large, deep scrape may take 1 to 2 weeks or longer to heal. A scab may form on some scrapes. Follow-up care is a key part of your treatment and safety. Be sure to make and go to all appointments, and call your doctor if you are having problems. It's also a good idea to know your test results and keep a list of the medicines you take. How can you care for yourself at home? · If your doctor told you how to care for your wound, follow your doctor's instructions. If you did not get instructions, follow this general advice:  ¨ Wash the scrape with clean water 2 times a day. Don't use hydrogen peroxide or alcohol, which can slow healing.   ¨ You may cover the scrape with a thin layer of petroleum jelly, such as Vaseline, and a nonstick bandage. ¨ Apply more petroleum jelly and replace the bandage as needed. · Prop up the injured area on a pillow anytime you sit or lie down during the next 3 days. Try to keep it above the level of your heart. This will help reduce swelling. · Be safe with medicines. Take pain medicines exactly as directed. ¨ If the doctor gave you a prescription medicine for pain, take it as prescribed. ¨ If you are not taking a prescription pain medicine, ask your doctor if you can take an over-the-counter medicine. When should you call for help? Call your doctor now or seek immediate medical care if:  ? · You have signs of infection, such as:  ¨ Increased pain, swelling, warmth, or redness around the scrape. ¨ Red streaks leading from the scrape. ¨ Pus draining from the scrape. ¨ A fever. ? · The scrape starts to bleed, and blood soaks through the bandage. Oozing small amounts of blood is normal.   ? Watch closely for changes in your health, and be sure to contact your doctor if the scrape is not getting better each day. Where can you learn more? Go to http://jailene-paula.info/. Enter A374 in the search box to learn more about \"Scrapes (Abrasions): Care Instructions. \"  Current as of: March 20, 2017  Content Version: 11.4  © 3360-8569 [employer]. Care instructions adapted under license by [employer] (which disclaims liability or warranty for this information). If you have questions about a medical condition or this instruction, always ask your healthcare professional. [place] [address] any warranty or liability for your use of this information. Knee Pain or Injury: Care Instructions  Your Care Instructions    Injuries are a common cause of knee problems. Sudden (acute) injuries may be caused by a direct blow to the knee. They can also be caused by abnormal twisting, bending, or falling on the knee.  Pain, bruising, or swelling may be severe, and may start within minutes of the injury. Overuse is another cause of knee pain. Other causes are climbing stairs, kneeling, and other activities that use the knee. Everyday wear and tear, especially as you get older, also can cause knee pain. Rest, along with home treatment, often relieves pain and allows your knee to heal. If you have a serious knee injury, you may need tests and treatment. Follow-up care is a key part of your treatment and safety. Be sure to make and go to all appointments, and call your doctor if you are having problems. It's also a good idea to know your test results and keep a list of the medicines you take. How can you care for yourself at home? · Be safe with medicines. Read and follow all instructions on the label. ¨ If the doctor gave you a prescription medicine for pain, take it as prescribed. ¨ If you are not taking a prescription pain medicine, ask your doctor if you can take an over-the-counter medicine. · Rest and protect your knee. Take a break from any activity that may cause pain. · Put ice or a cold pack on your knee for 10 to 20 minutes at a time. Put a thin cloth between the ice and your skin. · Prop up a sore knee on a pillow when you ice it or anytime you sit or lie down for the next 3 days. Try to keep it above the level of your heart. This will help reduce swelling. · If your knee is not swollen, you can put moist heat, a heating pad, or a warm cloth on your knee. · If your doctor recommends an elastic bandage, sleeve, or other type of support for your knee, wear it as directed. · Follow your doctor's instructions about how much weight you can put on your leg. Use a cane, crutches, or a walker as instructed. · Follow your doctor's instructions about activity during your healing process. If you can do mild exercise, slowly increase your activity. · Reach and stay at a healthy weight.  Extra weight can strain the joints, especially the knees and hips, and make the pain worse. Losing even a few pounds may help. When should you call for help? Call 911 anytime you think you may need emergency care. For example, call if:  ? · You have symptoms of a blood clot in your lung (called a pulmonary embolism). These may include:  ¨ Sudden chest pain. ¨ Trouble breathing. ¨ Coughing up blood. ?Call your doctor now or seek immediate medical care if:  ? · You have severe or increasing pain. ? · Your leg or foot turns cold or changes color. ? · You cannot stand or put weight on your knee. ? · Your knee looks twisted or bent out of shape. ? · You cannot move your knee. ? · You have signs of infection, such as:  ¨ Increased pain, swelling, warmth, or redness. ¨ Red streaks leading from the knee. ¨ Pus draining from a place on your knee. ¨ A fever. ? · You have signs of a blood clot in your leg (called a deep vein thrombosis), such as:  ¨ Pain in your calf, back of the knee, thigh, or groin. ¨ Redness and swelling in your leg or groin. ? Watch closely for changes in your health, and be sure to contact your doctor if:  ? · You have tingling, weakness, or numbness in your knee. ? · You have any new symptoms, such as swelling. ? · You have bruises from a knee injury that last longer than 2 weeks. ? · You do not get better as expected. Where can you learn more? Go to http://jailene-paula.info/. Enter K195 in the search box to learn more about \"Knee Pain or Injury: Care Instructions. \"  Current as of: March 20, 2017  Content Version: 11.4  © 3626-7599 [employer]. Care instructions adapted under license by [employer] (which disclaims liability or warranty for this information). If you have questions about a medical condition or this instruction, always ask your healthcare professional. [address] any warranty or liability for your use of this information.

## 2018-03-30 NOTE — ED PROVIDER NOTES
HPI Comments: 59 y.o. male with past medical history significant for PTSD, PSA elevation, HTN, prostate CA, sleep apnea and stroke who presents from home with chief complaint of knee pain. Per pt,he was walking down the steps of his home this evening, when he slipped and fell down approximately 6 steps. The pt reports that he did not hit his head during the fall or experience LOC. Some pain was noted over his left knee as well as left shin following the fall. Per pt, abrasions are noted over both the knee and shin upon inspection. He makes it known that he has been able to weight bare since the fall, yet does so with some unsteadiness. The pt rates his current pain 7/10. Per pt, his tetanus shot is UTD. He notes that he does not have a orthopedist at this time. Pt denies fever, chill, N/V/D, blurred vision, abd pain, CP, SOB, neck pain, HA, shoulder pain, hip pain, headache, syncope, numbness, tingling and urinary symptoms. There are no other acute medical concerns at this time. Social hx: Non-smoker, No current ETOH consumption    PCP: Kalyani Munoz MD    Note written by Kesha Luna, as dictated by Jacinto Yan PA-C 9:24 PM        The history is provided by the patient. No  was used.         Past Medical History:   Diagnosis Date    Cancer [facility]) 2016    prostate    Hypertension     PSA elevation     PTSD (post-traumatic stress disorder)     Sleep apnea     not compliant with CPAP    Stroke [facility])     April 2017, MRI with chronic white matter        Past Surgical History:   Procedure Laterality Date    NH PROSTATE BIOPSY, NEEDLE, SATURATION SAMPLING      x 2         Family History:   Problem Relation Age of Onset    Heart Disease Mother     Diabetes Mother     Hypertension Mother     Cancer Mother     Substance Abuse Sister     Psychiatric Disorder Sister     Heart Disease Sister     Substance Abuse Sister        Social History     Social History    Marital status:      Spouse name: N/A    Number of children: N/A    Years of education: N/A     Occupational History    Not on file. Social History Main Topics    Smoking status: Never Smoker    Smokeless tobacco: Never Used    Alcohol use No    Drug use: No    Sexual activity: Not on file     Other Topics Concern    Not on file     Social History Narrative         ALLERGIES: Review of patient's allergies indicates no known allergies. Review of Systems   Constitutional: Negative for chills and fever. Eyes: Negative for visual disturbance. Respiratory: Negative for cough and shortness of breath. Cardiovascular: Negative for chest pain. Gastrointestinal: Negative for abdominal pain, diarrhea, nausea and vomiting. Genitourinary: Negative for dysuria, frequency and hematuria. Musculoskeletal: Positive for arthralgias (left knee and into left shin). Negative for back pain and neck pain. Neurological: Negative for dizziness, syncope, numbness and headaches. All other systems reviewed and are negative. Vitals:    03/29/18 2048   BP: (!) 167/94   Pulse: 78   Resp: 16   Temp: 99 °F (37.2 °C)   SpO2: 100%   Weight: 77.1 kg (170 lb)   Height: 5' 6\" (1.676 m)            Physical Exam   Constitutional: He is oriented to person, place, and time. He appears well-developed and well-nourished. He is active. Non-toxic appearance. No distress. HENT:   Head: Normocephalic and atraumatic. Eyes: Conjunctivae are normal. Pupils are equal, round, and reactive to light. Right eye exhibits no discharge. Left eye exhibits no discharge. Neck: Normal range of motion and full passive range of motion without pain. Neck supple. No tracheal tenderness present. Cardiovascular: Normal rate, regular rhythm, normal heart sounds, intact distal pulses and normal pulses. Exam reveals no gallop and no friction rub. No murmur heard. Pulmonary/Chest: Effort normal and breath sounds normal. No respiratory distress. He has no wheezes. He has no rales. He exhibits no tenderness. Abdominal: Soft. Bowel sounds are normal. He exhibits no distension. There is no tenderness. There is no rebound and no guarding. Musculoskeletal: He exhibits tenderness. He exhibits no edema. L knee- superficial abrasion to prepatella regions and mid shin with TTP; LROM of L knee due to pain. Pelvis stable; nonteder to pelvis or hip. No ankle TTP with FROM. Distal pulses 2+. NVI throughout. Neurological: He is alert and oriented to person, place, and time. He has normal strength. No cranial nerve deficit or sensory deficit. Coordination normal.   Skin: Skin is warm, dry and intact. No abrasion and no rash noted. He is not diaphoretic. No erythema. Psychiatric: He has a normal mood and affect. His speech is normal and behavior is normal. Cognition and memory are normal.   Nursing note and vitals reviewed. MDM  Number of Diagnoses or Management Options  Abrasion of left lower leg, initial encounter:   Contusion of left knee and lower leg, initial encounter:   Diagnosis management comments:   Ddx: frx, contusion       Amount and/or Complexity of Data Reviewed  Review and summarize past medical records: yes    Patient Progress  Patient progress: stable        ED Course       Procedures     No frx on XR, has been ambulatory since the fall but states he would lik something for more stability. Wife has a spare walker at home and will let him use this one. Will ACE wrap and ambulate with walker to make sure he can weight-bear and give orthopedics f/u. He declines offer for pain meds including Tylenol, has been told by his cardiology to stop NSAIDs due to his kidney function going up so will defer using this. Will give ice pack for home. Ron Lares PA-C           DISCHARGE NOTE:  10:37 PM  The patient's results have been reviewed with them and/or available family.  Patient and/or family verbally conveyed their understanding and agreement of the patient's signs, symptoms, diagnosis, treatment and prognosis and additionally agree to follow up as recommended in the discharge instructions or to return to the Emergency Room should their condition change prior to their follow-up appointment. The patient/family verbally agrees with the care-plan and verbally conveys that all of their questions have been answered. The discharge instructions have also been provided to the patient and/or family with some educational information regarding the patient's diagnosis as well a list of reasons why the patient would want to return to the ER prior to their follow-up appointment, should their condition change. Plan:  1. F/U with orthopedics in the next few days  2.  ACE wrap; advised to use walker which he has at home and agrees to use for added stability  Return precautions discussed and advised to return to ER if worse

## 2018-03-30 NOTE — ED NOTES
Pt knee cleaned with SAF -Clens. Non-adherent dressign applied. Ace wrap placed on knee. Pt ambulated with walker.  Taken to car in wheelchair

## 2019-01-14 ENCOUNTER — HOSPITAL ENCOUNTER (OUTPATIENT)
Dept: PREADMISSION TESTING | Age: 59
Discharge: HOME | End: 2019-01-14
Payer: OTHER GOVERNMENT

## 2019-01-14 VITALS
TEMPERATURE: 98.1 F | BODY MASS INDEX: 28.45 KG/M2 | HEIGHT: 66 IN | WEIGHT: 177 LBS | DIASTOLIC BLOOD PRESSURE: 78 MMHG | SYSTOLIC BLOOD PRESSURE: 157 MMHG | HEART RATE: 82 BPM | RESPIRATION RATE: 16 BRPM

## 2019-01-14 LAB
APPEARANCE UR: CLEAR
BACTERIA URNS QL MICRO: NEGATIVE /HPF
BASOPHILS # BLD: 0 K/UL (ref 0–0.1)
BASOPHILS NFR BLD: 1 % (ref 0–1)
BILIRUB UR QL: NEGATIVE
COLOR UR: NORMAL
DIFFERENTIAL METHOD BLD: NORMAL
EOSINOPHIL # BLD: 0.1 K/UL (ref 0–0.4)
EOSINOPHIL NFR BLD: 2 % (ref 0–7)
EPITH CASTS URNS QL MICRO: NORMAL /LPF
ERYTHROCYTE [DISTWIDTH] IN BLOOD BY AUTOMATED COUNT: 13.5 % (ref 11.5–14.5)
GLUCOSE UR STRIP.AUTO-MCNC: NEGATIVE MG/DL
HCT VFR BLD AUTO: 43.8 % (ref 36.6–50.3)
HGB BLD-MCNC: 14.2 G/DL (ref 12.1–17)
HGB UR QL STRIP: NEGATIVE
HYALINE CASTS URNS QL MICRO: NORMAL /LPF (ref 0–5)
IMM GRANULOCYTES # BLD AUTO: 0 K/UL (ref 0–0.04)
IMM GRANULOCYTES NFR BLD AUTO: 0 % (ref 0–0.5)
KETONES UR QL STRIP.AUTO: NEGATIVE MG/DL
LEUKOCYTE ESTERASE UR QL STRIP.AUTO: NEGATIVE
LYMPHOCYTES # BLD: 1.3 K/UL (ref 0.8–3.5)
LYMPHOCYTES NFR BLD: 31 % (ref 12–49)
MCH RBC QN AUTO: 28.7 PG (ref 26–34)
MCHC RBC AUTO-ENTMCNC: 32.4 G/DL (ref 30–36.5)
MCV RBC AUTO: 88.7 FL (ref 80–99)
MONOCYTES # BLD: 0.3 K/UL (ref 0–1)
MONOCYTES NFR BLD: 7 % (ref 5–13)
NEUTS SEG # BLD: 2.4 K/UL (ref 1.8–8)
NEUTS SEG NFR BLD: 59 % (ref 32–75)
NITRITE UR QL STRIP.AUTO: NEGATIVE
NRBC # BLD: 0 K/UL (ref 0–0.01)
NRBC BLD-RTO: 0 PER 100 WBC
PH UR STRIP: 7.5 [PH] (ref 5–8)
PLATELET # BLD AUTO: 263 K/UL (ref 150–400)
PMV BLD AUTO: 10.9 FL (ref 8.9–12.9)
PROT UR STRIP-MCNC: NEGATIVE MG/DL
RBC # BLD AUTO: 4.94 M/UL (ref 4.1–5.7)
RBC #/AREA URNS HPF: NORMAL /HPF (ref 0–5)
SP GR UR REFRACTOMETRY: 1.01 (ref 1–1.03)
UROBILINOGEN UR QL STRIP.AUTO: 0.2 EU/DL (ref 0.2–1)
WBC # BLD AUTO: 4.1 K/UL (ref 4.1–11.1)
WBC URNS QL MICRO: NORMAL /HPF (ref 0–4)

## 2019-01-14 PROCEDURE — 36415 COLL VENOUS BLD VENIPUNCTURE: CPT

## 2019-01-14 PROCEDURE — 87086 URINE CULTURE/COLONY COUNT: CPT

## 2019-01-14 PROCEDURE — 81001 URINALYSIS AUTO W/SCOPE: CPT

## 2019-01-14 PROCEDURE — 80053 COMPREHEN METABOLIC PANEL: CPT

## 2019-01-14 PROCEDURE — 85025 COMPLETE CBC W/AUTO DIFF WBC: CPT

## 2019-01-14 RX ORDER — MELATONIN
1000 DAILY
COMMUNITY

## 2019-01-14 RX ORDER — TOLTERODINE 4 MG/1
4 CAPSULE, EXTENDED RELEASE ORAL DAILY
COMMUNITY

## 2019-01-14 NOTE — PERIOP NOTES
DO NOT EAT OR DRINK ANYTHING AFTER MIDNIGHT, except as instructed THE NIGHT BEFORE SURGERY. PT GIVEN OPPORTUNITY TO ASK ADDITIONAL QUESTIONS. Patient given CHG wipes and instruction sheet, instructions for use reviewed with patient. Patient given surgical site infection information FAQs handout and hand hygiene tips sheet. Pre-operative instructions reviewed and patient verbalizes understanding of instructions. Patient has been given the opportunity to ask additional questions.

## 2019-01-15 LAB
ALBUMIN SERPL-MCNC: 4 G/DL (ref 3.5–5)
ALBUMIN/GLOB SERPL: 1.2 {RATIO} (ref 1.1–2.2)
ALP SERPL-CCNC: 183 U/L (ref 45–117)
ALT SERPL-CCNC: 85 U/L (ref 12–78)
ANION GAP SERPL CALC-SCNC: 7 MMOL/L (ref 5–15)
AST SERPL-CCNC: 36 U/L (ref 15–37)
BILIRUB SERPL-MCNC: 0.3 MG/DL (ref 0.2–1)
BUN SERPL-MCNC: 10 MG/DL (ref 6–20)
BUN/CREAT SERPL: 8 (ref 12–20)
CALCIUM SERPL-MCNC: 8.8 MG/DL (ref 8.5–10.1)
CHLORIDE SERPL-SCNC: 106 MMOL/L (ref 97–108)
CO2 SERPL-SCNC: 30 MMOL/L (ref 21–32)
CREAT SERPL-MCNC: 1.27 MG/DL (ref 0.7–1.3)
GLOBULIN SER CALC-MCNC: 3.3 G/DL (ref 2–4)
GLUCOSE SERPL-MCNC: 110 MG/DL (ref 65–100)
POTASSIUM SERPL-SCNC: 3.3 MMOL/L (ref 3.5–5.1)
PROT SERPL-MCNC: 7.3 G/DL (ref 6.4–8.2)
SODIUM SERPL-SCNC: 143 MMOL/L (ref 136–145)

## 2019-01-15 NOTE — PERIOP NOTES
Faxed PAT testing reports (and fax confirmation received) to Dr. Bell Floyd office. Called at [phone] on 01/15/19 (left message on [facility]: abnormal LABS.     K+: 3.3  ALK PHOS: [phone]  ALT: 85

## 2019-01-16 LAB
BACTERIA SPEC CULT: NORMAL
CC UR VC: NORMAL
SERVICE CMNT-IMP: NORMAL

## 2019-01-17 NOTE — PERIOP NOTES
RECEIVED A CALL FROM NURSE IN DR. CHRIS GILL OFFICE THAT ABNORMAL LABS WERE SEEN AND DOCTOR IS REFERRING PATIENT BACK TO PCP FOR ABNORMAL LABS POTASSIUM 3.3 (L), ALK PHOS [phone] (H)

## 2019-01-22 ENCOUNTER — ANESTHESIA (OUTPATIENT)
Dept: SURGERY | Age: 59
End: 2019-01-22
Payer: OTHER GOVERNMENT

## 2019-01-22 ENCOUNTER — HOSPITAL ENCOUNTER (OUTPATIENT)
Age: 59
Setting detail: OBSERVATION
Discharge: HOME | End: 2019-01-23
Attending: UROLOGY | Admitting: UROLOGY
Payer: OTHER GOVERNMENT

## 2019-01-22 ENCOUNTER — ANESTHESIA EVENT (OUTPATIENT)
Dept: SURGERY | Age: 59
End: 2019-01-22
Payer: OTHER GOVERNMENT

## 2019-01-22 DIAGNOSIS — N39.3 MALE STRESS INCONTINENCE: Primary | ICD-10-CM

## 2019-01-22 LAB
ANION GAP BLD CALC-SCNC: 21 MMOL/L (ref 10–20)
BUN BLD-MCNC: 11 MG/DL (ref 9–20)
CA-I BLD-MCNC: 1.14 MMOL/L (ref 1.12–1.32)
CHLORIDE BLD-SCNC: 103 MMOL/L (ref 98–107)
CO2 BLD-SCNC: 26 MMOL/L (ref 21–32)
CREAT BLD-MCNC: 1.2 MG/DL (ref 0.6–1.3)
GLUCOSE BLD-MCNC: 91 MG/DL (ref 65–100)
HCT VFR BLD CALC: 35 % (ref 36.6–50.3)
POTASSIUM BLD-SCNC: 3.4 MMOL/L (ref 3.5–5.1)
SERVICE CMNT-IMP: ABNORMAL
SODIUM BLD-SCNC: 145 MMOL/L (ref 136–145)

## 2019-01-22 PROCEDURE — 77030010545: Performed by: UROLOGY

## 2019-01-22 PROCEDURE — 77030002933 HC SUT MCRYL J&J -A: Performed by: UROLOGY

## 2019-01-22 PROCEDURE — 80047 BASIC METABLC PNL IONIZED CA: CPT

## 2019-01-22 PROCEDURE — 74011000272 HC RX REV CODE- 272: Performed by: UROLOGY

## 2019-01-22 PROCEDURE — 74011000250 HC RX REV CODE- 250

## 2019-01-22 PROCEDURE — C1813 PROSTHESIS, PENILE, INFLATAB: HCPCS | Performed by: UROLOGY

## 2019-01-22 PROCEDURE — 77030034696 HC CATH URETH FOL 2W BARD -A: Performed by: UROLOGY

## 2019-01-22 PROCEDURE — 77030020229 HC RETRCTR SCROT SYS BSC -D: Performed by: UROLOGY

## 2019-01-22 PROCEDURE — 77030010938 HC CLP LIG TELE -A: Performed by: UROLOGY

## 2019-01-22 PROCEDURE — 77030039266 HC ADH SKN EXOFIN S2SG -A: Performed by: UROLOGY

## 2019-01-22 PROCEDURE — 77030031139 HC SUT VCRL2 J&J -A: Performed by: UROLOGY

## 2019-01-22 PROCEDURE — 77030013781 HC KT PENIL ACC BSC -F: Performed by: UROLOGY

## 2019-01-22 PROCEDURE — 74011250636 HC RX REV CODE- 250/636: Performed by: ANESTHESIOLOGY

## 2019-01-22 PROCEDURE — 77030003028 HC SUT VCRL J&J -A: Performed by: UROLOGY

## 2019-01-22 PROCEDURE — P9045 ALBUMIN (HUMAN), 5%, 250 ML: HCPCS

## 2019-01-22 PROCEDURE — 77030014647 HC SEAL FBRN TISSL BAXT -D: Performed by: UROLOGY

## 2019-01-22 PROCEDURE — 74011250637 HC RX REV CODE- 250/637: Performed by: UROLOGY

## 2019-01-22 PROCEDURE — 77030020263 HC SOL INJ SOD CL0.9% LFCR 1000ML: Performed by: UROLOGY

## 2019-01-22 PROCEDURE — 77030008684 HC TU ET CUF COVD -B: Performed by: ANESTHESIOLOGY

## 2019-01-22 PROCEDURE — 77030002966 HC SUT PDS J&J -A: Performed by: UROLOGY

## 2019-01-22 PROCEDURE — C1815 PROS, URINARY SPH, IMP: HCPCS | Performed by: UROLOGY

## 2019-01-22 PROCEDURE — 77030018846 HC SOL IRR STRL H20 ICUM -A: Performed by: UROLOGY

## 2019-01-22 PROCEDURE — 77030034849: Performed by: UROLOGY

## 2019-01-22 PROCEDURE — 77030018702 HC CLP LIG TI TELE -A: Performed by: UROLOGY

## 2019-01-22 PROCEDURE — 77030032490 HC SLV COMPR SCD KNE COVD -B: Performed by: UROLOGY

## 2019-01-22 PROCEDURE — 74011000250 HC RX REV CODE- 250: Performed by: UROLOGY

## 2019-01-22 PROCEDURE — 76060000038 HC ANESTHESIA 3.5 TO 4 HR: Performed by: UROLOGY

## 2019-01-22 PROCEDURE — 76010000134 HC OR TIME 3.5 TO 4 HR: Performed by: UROLOGY

## 2019-01-22 PROCEDURE — 77030002986 HC SUT PROL J&J -A: Performed by: UROLOGY

## 2019-01-22 PROCEDURE — 99218 HC RM OBSERVATION: CPT

## 2019-01-22 PROCEDURE — 74011250636 HC RX REV CODE- 250/636: Performed by: UROLOGY

## 2019-01-22 PROCEDURE — 77030011640 HC PAD GRND REM COVD -A: Performed by: UROLOGY

## 2019-01-22 PROCEDURE — 77030026438 HC STYL ET INTUB CARD -A: Performed by: ANESTHESIOLOGY

## 2019-01-22 PROCEDURE — 74011250636 HC RX REV CODE- 250/636

## 2019-01-22 PROCEDURE — 74011000258 HC RX REV CODE- 258

## 2019-01-22 PROCEDURE — 76210000017 HC OR PH I REC 1.5 TO 2 HR: Performed by: UROLOGY

## 2019-01-22 PROCEDURE — 74011000258 HC RX REV CODE- 258: Performed by: UROLOGY

## 2019-01-22 PROCEDURE — 77030018836 HC SOL IRR NACL ICUM -A: Performed by: UROLOGY

## 2019-01-22 PROCEDURE — 77030014366 HC DRN WND BNTM -A: Performed by: UROLOGY

## 2019-01-22 PROCEDURE — 77030013784 HC KT PENIL PROS COLO -E: Performed by: UROLOGY

## 2019-01-22 DEVICE — CL RESERVOIR
Type: IMPLANTABLE DEVICE | Site: PENIS | Status: FUNCTIONAL
Brand: TITAN

## 2019-01-22 DEVICE — CONTROL PUMP WITH INHIBIZONE
Type: IMPLANTABLE DEVICE | Site: PENIS | Status: FUNCTIONAL
Brand: AMS 800 ARTIFICIAL URINARY SPHINCTER

## 2019-01-22 DEVICE — TITAN® TOUCH SCROTAL ZERO DEGREE ANGLE CYLINDER SET WITH PUMP
Type: IMPLANTABLE DEVICE | Site: PENIS | Status: FUNCTIONAL
Brand: TITAN

## 2019-01-22 DEVICE — ASSEMBLY KIT
Type: IMPLANTABLE DEVICE | Site: PENIS | Status: FUNCTIONAL
Brand: TITAN

## 2019-01-22 RX ORDER — HYDRALAZINE HYDROCHLORIDE 50 MG/1
50 TABLET, FILM COATED ORAL 2 TIMES DAILY
COMMUNITY
Start: 2019-01-16

## 2019-01-22 RX ORDER — MIDAZOLAM HYDROCHLORIDE 1 MG/ML
1 INJECTION, SOLUTION INTRAMUSCULAR; INTRAVENOUS AS NEEDED
Status: DISCONTINUED | OUTPATIENT
Start: 2019-01-22 | End: 2019-01-22 | Stop reason: HOSPADM

## 2019-01-22 RX ORDER — ATORVASTATIN CALCIUM 40 MG/1
80 TABLET, FILM COATED ORAL
Status: DISCONTINUED | OUTPATIENT
Start: 2019-01-22 | End: 2019-01-23 | Stop reason: HOSPADM

## 2019-01-22 RX ORDER — NEOSTIGMINE METHYLSULFATE 1 MG/ML
INJECTION INTRAVENOUS AS NEEDED
Status: DISCONTINUED | OUTPATIENT
Start: 2019-01-22 | End: 2019-01-22 | Stop reason: HOSPADM

## 2019-01-22 RX ORDER — PHENYLEPHRINE HCL IN 0.9% NACL 0.4MG/10ML
SYRINGE (ML) INTRAVENOUS AS NEEDED
Status: DISCONTINUED | OUTPATIENT
Start: 2019-01-22 | End: 2019-01-22 | Stop reason: HOSPADM

## 2019-01-22 RX ORDER — OXYCODONE AND ACETAMINOPHEN 5; 325 MG/1; MG/1
1-2 TABLET ORAL
Qty: 20 TAB | Refills: 0 | Status: SHIPPED | OUTPATIENT
Start: 2019-01-22

## 2019-01-22 RX ORDER — OXYCODONE HYDROCHLORIDE 5 MG/1
5 TABLET ORAL
Status: DISCONTINUED | OUTPATIENT
Start: 2019-01-22 | End: 2019-01-23 | Stop reason: HOSPADM

## 2019-01-22 RX ORDER — NALOXONE HYDROCHLORIDE 0.4 MG/ML
0.4 INJECTION, SOLUTION INTRAMUSCULAR; INTRAVENOUS; SUBCUTANEOUS
Status: DISCONTINUED | OUTPATIENT
Start: 2019-01-22 | End: 2019-01-23 | Stop reason: HOSPADM

## 2019-01-22 RX ORDER — ACETAMINOPHEN 325 MG/1
650 TABLET ORAL
Status: DISCONTINUED | OUTPATIENT
Start: 2019-01-22 | End: 2019-01-23 | Stop reason: HOSPADM

## 2019-01-22 RX ORDER — HYDRALAZINE HYDROCHLORIDE 25 MG/1
50 TABLET, FILM COATED ORAL 2 TIMES DAILY
Status: DISCONTINUED | OUTPATIENT
Start: 2019-01-22 | End: 2019-01-23 | Stop reason: HOSPADM

## 2019-01-22 RX ORDER — SODIUM CHLORIDE 0.9 % (FLUSH) 0.9 %
5-40 SYRINGE (ML) INJECTION AS NEEDED
Status: DISCONTINUED | OUTPATIENT
Start: 2019-01-22 | End: 2019-01-22 | Stop reason: HOSPADM

## 2019-01-22 RX ORDER — HYDROMORPHONE HYDROCHLORIDE 2 MG/ML
INJECTION, SOLUTION INTRAMUSCULAR; INTRAVENOUS; SUBCUTANEOUS AS NEEDED
Status: DISCONTINUED | OUTPATIENT
Start: 2019-01-22 | End: 2019-01-22 | Stop reason: HOSPADM

## 2019-01-22 RX ORDER — PROPOFOL 10 MG/ML
INJECTION, EMULSION INTRAVENOUS AS NEEDED
Status: DISCONTINUED | OUTPATIENT
Start: 2019-01-22 | End: 2019-01-22 | Stop reason: HOSPADM

## 2019-01-22 RX ORDER — FENTANYL CITRATE 50 UG/ML
50 INJECTION, SOLUTION INTRAMUSCULAR; INTRAVENOUS AS NEEDED
Status: DISCONTINUED | OUTPATIENT
Start: 2019-01-22 | End: 2019-01-22 | Stop reason: HOSPADM

## 2019-01-22 RX ORDER — ROPIVACAINE HYDROCHLORIDE 5 MG/ML
30 INJECTION, SOLUTION EPIDURAL; INFILTRATION; PERINEURAL ONCE
Status: DISCONTINUED | OUTPATIENT
Start: 2019-01-22 | End: 2019-01-22 | Stop reason: HOSPADM

## 2019-01-22 RX ORDER — CLONIDINE HYDROCHLORIDE 0.1 MG/1
0.1 TABLET ORAL
Status: DISCONTINUED | OUTPATIENT
Start: 2019-01-23 | End: 2019-01-23 | Stop reason: HOSPADM

## 2019-01-22 RX ORDER — LIDOCAINE HYDROCHLORIDE 20 MG/ML
INJECTION, SOLUTION EPIDURAL; INFILTRATION; INTRACAUDAL; PERINEURAL AS NEEDED
Status: DISCONTINUED | OUTPATIENT
Start: 2019-01-22 | End: 2019-01-22 | Stop reason: HOSPADM

## 2019-01-22 RX ORDER — ONDANSETRON 2 MG/ML
4 INJECTION INTRAMUSCULAR; INTRAVENOUS AS NEEDED
Status: DISCONTINUED | OUTPATIENT
Start: 2019-01-22 | End: 2019-01-22 | Stop reason: HOSPADM

## 2019-01-22 RX ORDER — ROCURONIUM BROMIDE 10 MG/ML
INJECTION, SOLUTION INTRAVENOUS AS NEEDED
Status: DISCONTINUED | OUTPATIENT
Start: 2019-01-22 | End: 2019-01-22 | Stop reason: HOSPADM

## 2019-01-22 RX ORDER — HYDRALAZINE HYDROCHLORIDE 20 MG/ML
5 INJECTION INTRAMUSCULAR; INTRAVENOUS
Status: DISCONTINUED | OUTPATIENT
Start: 2019-01-22 | End: 2019-01-22 | Stop reason: HOSPADM

## 2019-01-22 RX ORDER — SODIUM CHLORIDE 0.9 % (FLUSH) 0.9 %
5-40 SYRINGE (ML) INJECTION EVERY 8 HOURS
Status: DISCONTINUED | OUTPATIENT
Start: 2019-01-22 | End: 2019-01-23 | Stop reason: HOSPADM

## 2019-01-22 RX ORDER — MIRTAZAPINE 15 MG/1
15 TABLET, FILM COATED ORAL
Status: DISCONTINUED | OUTPATIENT
Start: 2019-01-22 | End: 2019-01-23 | Stop reason: HOSPADM

## 2019-01-22 RX ORDER — MIDAZOLAM HYDROCHLORIDE 1 MG/ML
INJECTION, SOLUTION INTRAMUSCULAR; INTRAVENOUS AS NEEDED
Status: DISCONTINUED | OUTPATIENT
Start: 2019-01-22 | End: 2019-01-22 | Stop reason: HOSPADM

## 2019-01-22 RX ORDER — BUPROPION HYDROCHLORIDE 100 MG/1
200 TABLET, EXTENDED RELEASE ORAL DAILY
Status: DISCONTINUED | OUTPATIENT
Start: 2019-01-23 | End: 2019-01-23 | Stop reason: HOSPADM

## 2019-01-22 RX ORDER — DEXTROSE, SODIUM CHLORIDE, AND POTASSIUM CHLORIDE 5; .45; .15 G/100ML; G/100ML; G/100ML
100 INJECTION INTRAVENOUS CONTINUOUS
Status: DISPENSED | OUTPATIENT
Start: 2019-01-22 | End: 2019-01-22

## 2019-01-22 RX ORDER — CLONAZEPAM 1 MG/1
2 TABLET ORAL DAILY
Status: DISCONTINUED | OUTPATIENT
Start: 2019-01-23 | End: 2019-01-23 | Stop reason: HOSPADM

## 2019-01-22 RX ORDER — FLUOXETINE HYDROCHLORIDE 40 MG/1
40 CAPSULE ORAL DAILY
COMMUNITY

## 2019-01-22 RX ORDER — MORPHINE SULFATE 10 MG/ML
2 INJECTION, SOLUTION INTRAMUSCULAR; INTRAVENOUS
Status: DISCONTINUED | OUTPATIENT
Start: 2019-01-22 | End: 2019-01-22 | Stop reason: HOSPADM

## 2019-01-22 RX ORDER — ONDANSETRON 2 MG/ML
4 INJECTION INTRAMUSCULAR; INTRAVENOUS
Status: DISCONTINUED | OUTPATIENT
Start: 2019-01-22 | End: 2019-01-23 | Stop reason: HOSPADM

## 2019-01-22 RX ORDER — ONDANSETRON 2 MG/ML
INJECTION INTRAMUSCULAR; INTRAVENOUS AS NEEDED
Status: DISCONTINUED | OUTPATIENT
Start: 2019-01-22 | End: 2019-01-22 | Stop reason: HOSPADM

## 2019-01-22 RX ORDER — SODIUM CHLORIDE 9 MG/ML
25 INJECTION, SOLUTION INTRAVENOUS CONTINUOUS
Status: DISCONTINUED | OUTPATIENT
Start: 2019-01-22 | End: 2019-01-22 | Stop reason: HOSPADM

## 2019-01-22 RX ORDER — LIDOCAINE HYDROCHLORIDE 10 MG/ML
0.1 INJECTION, SOLUTION EPIDURAL; INFILTRATION; INTRACAUDAL; PERINEURAL AS NEEDED
Status: DISCONTINUED | OUTPATIENT
Start: 2019-01-22 | End: 2019-01-22 | Stop reason: HOSPADM

## 2019-01-22 RX ORDER — SODIUM CHLORIDE, SODIUM LACTATE, POTASSIUM CHLORIDE, CALCIUM CHLORIDE 600; 310; 30; 20 MG/100ML; MG/100ML; MG/100ML; MG/100ML
INJECTION, SOLUTION INTRAVENOUS
Status: DISCONTINUED | OUTPATIENT
Start: 2019-01-22 | End: 2019-01-22 | Stop reason: HOSPADM

## 2019-01-22 RX ORDER — GLYCOPYRROLATE 0.2 MG/ML
INJECTION INTRAMUSCULAR; INTRAVENOUS AS NEEDED
Status: DISCONTINUED | OUTPATIENT
Start: 2019-01-22 | End: 2019-01-22 | Stop reason: HOSPADM

## 2019-01-22 RX ORDER — DEXAMETHASONE SODIUM PHOSPHATE 4 MG/ML
INJECTION, SOLUTION INTRA-ARTICULAR; INTRALESIONAL; INTRAMUSCULAR; INTRAVENOUS; SOFT TISSUE AS NEEDED
Status: DISCONTINUED | OUTPATIENT
Start: 2019-01-22 | End: 2019-01-22 | Stop reason: HOSPADM

## 2019-01-22 RX ORDER — SULFAMETHOXAZOLE AND TRIMETHOPRIM 800; 160 MG/1; MG/1
1 TABLET ORAL 2 TIMES DAILY
Qty: 10 TAB | Refills: 0 | Status: SHIPPED | OUTPATIENT
Start: 2019-01-22 | End: 2019-01-27

## 2019-01-22 RX ORDER — SODIUM CHLORIDE, SODIUM LACTATE, POTASSIUM CHLORIDE, CALCIUM CHLORIDE 600; 310; 30; 20 MG/100ML; MG/100ML; MG/100ML; MG/100ML
75 INJECTION, SOLUTION INTRAVENOUS CONTINUOUS
Status: DISCONTINUED | OUTPATIENT
Start: 2019-01-22 | End: 2019-01-22 | Stop reason: HOSPADM

## 2019-01-22 RX ORDER — ACETAMINOPHEN 10 MG/ML
INJECTION, SOLUTION INTRAVENOUS AS NEEDED
Status: DISCONTINUED | OUTPATIENT
Start: 2019-01-22 | End: 2019-01-22 | Stop reason: HOSPADM

## 2019-01-22 RX ORDER — FENTANYL CITRATE 50 UG/ML
25 INJECTION, SOLUTION INTRAMUSCULAR; INTRAVENOUS
Status: DISCONTINUED | OUTPATIENT
Start: 2019-01-22 | End: 2019-01-22 | Stop reason: HOSPADM

## 2019-01-22 RX ORDER — KETAMINE HYDROCHLORIDE 10 MG/ML
INJECTION, SOLUTION INTRAMUSCULAR; INTRAVENOUS AS NEEDED
Status: DISCONTINUED | OUTPATIENT
Start: 2019-01-22 | End: 2019-01-22 | Stop reason: HOSPADM

## 2019-01-22 RX ORDER — SODIUM CHLORIDE 0.9 % (FLUSH) 0.9 %
5-40 SYRINGE (ML) INJECTION AS NEEDED
Status: DISCONTINUED | OUTPATIENT
Start: 2019-01-22 | End: 2019-01-23 | Stop reason: HOSPADM

## 2019-01-22 RX ORDER — FENTANYL CITRATE 50 UG/ML
INJECTION, SOLUTION INTRAMUSCULAR; INTRAVENOUS AS NEEDED
Status: DISCONTINUED | OUTPATIENT
Start: 2019-01-22 | End: 2019-01-22 | Stop reason: HOSPADM

## 2019-01-22 RX ORDER — SODIUM CHLORIDE 0.9 % (FLUSH) 0.9 %
5-40 SYRINGE (ML) INJECTION EVERY 8 HOURS
Status: DISCONTINUED | OUTPATIENT
Start: 2019-01-22 | End: 2019-01-22 | Stop reason: HOSPADM

## 2019-01-22 RX ORDER — SODIUM CHLORIDE, SODIUM LACTATE, POTASSIUM CHLORIDE, CALCIUM CHLORIDE 600; 310; 30; 20 MG/100ML; MG/100ML; MG/100ML; MG/100ML
125 INJECTION, SOLUTION INTRAVENOUS CONTINUOUS
Status: DISCONTINUED | OUTPATIENT
Start: 2019-01-22 | End: 2019-01-22 | Stop reason: HOSPADM

## 2019-01-22 RX ORDER — FLUOXETINE HYDROCHLORIDE 20 MG/1
40 CAPSULE ORAL DAILY
Status: DISCONTINUED | OUTPATIENT
Start: 2019-01-23 | End: 2019-01-23 | Stop reason: HOSPADM

## 2019-01-22 RX ORDER — MIDAZOLAM HYDROCHLORIDE 1 MG/ML
0.5 INJECTION, SOLUTION INTRAMUSCULAR; INTRAVENOUS
Status: DISCONTINUED | OUTPATIENT
Start: 2019-01-22 | End: 2019-01-22 | Stop reason: HOSPADM

## 2019-01-22 RX ORDER — SUCCINYLCHOLINE CHLORIDE 20 MG/ML
INJECTION INTRAMUSCULAR; INTRAVENOUS AS NEEDED
Status: DISCONTINUED | OUTPATIENT
Start: 2019-01-22 | End: 2019-01-22 | Stop reason: HOSPADM

## 2019-01-22 RX ORDER — LIDOCAINE HYDROCHLORIDE ANHYDROUS AND DEXTROSE MONOHYDRATE .8; 5 G/100ML; G/100ML
INJECTION, SOLUTION INTRAVENOUS
Status: DISCONTINUED | OUTPATIENT
Start: 2019-01-22 | End: 2019-01-22 | Stop reason: HOSPADM

## 2019-01-22 RX ORDER — ALBUMIN HUMAN 50 G/1000ML
SOLUTION INTRAVENOUS AS NEEDED
Status: DISCONTINUED | OUTPATIENT
Start: 2019-01-22 | End: 2019-01-22 | Stop reason: HOSPADM

## 2019-01-22 RX ORDER — METOPROLOL SUCCINATE 50 MG/1
200 TABLET, EXTENDED RELEASE ORAL DAILY
Status: DISCONTINUED | OUTPATIENT
Start: 2019-01-23 | End: 2019-01-23 | Stop reason: HOSPADM

## 2019-01-22 RX ORDER — VANCOMYCIN/0.9 % SOD CHLORIDE 1.5G/250ML
1500 PLASTIC BAG, INJECTION (ML) INTRAVENOUS ONCE
Status: COMPLETED | OUTPATIENT
Start: 2019-01-22 | End: 2019-01-23

## 2019-01-22 RX ADMIN — FENTANYL CITRATE 25 MCG: 50 INJECTION INTRAMUSCULAR; INTRAVENOUS at 19:57

## 2019-01-22 RX ADMIN — OXYCODONE HYDROCHLORIDE 5 MG: 5 TABLET ORAL at 22:44

## 2019-01-22 RX ADMIN — ROCURONIUM BROMIDE 10 MG: 10 INJECTION, SOLUTION INTRAVENOUS at 14:40

## 2019-01-22 RX ADMIN — VANCOMYCIN HYDROCHLORIDE 1500 MG: 10 INJECTION, POWDER, LYOPHILIZED, FOR SOLUTION INTRAVENOUS at 22:41

## 2019-01-22 RX ADMIN — DEXAMETHASONE SODIUM PHOSPHATE 4 MG: 4 INJECTION, SOLUTION INTRA-ARTICULAR; INTRALESIONAL; INTRAMUSCULAR; INTRAVENOUS; SOFT TISSUE at 14:27

## 2019-01-22 RX ADMIN — SUCCINYLCHOLINE CHLORIDE 140 MG: 20 INJECTION INTRAMUSCULAR; INTRAVENOUS at 13:31

## 2019-01-22 RX ADMIN — HYDRALAZINE HYDROCHLORIDE 50 MG: 25 TABLET, FILM COATED ORAL at 22:44

## 2019-01-22 RX ADMIN — NEOSTIGMINE METHYLSULFATE 1 MG: 1 INJECTION INTRAVENOUS at 17:08

## 2019-01-22 RX ADMIN — DEXTROSE MONOHYDRATE, SODIUM CHLORIDE, AND POTASSIUM CHLORIDE 100 ML/HR: 50; 4.5; 1.49 INJECTION, SOLUTION INTRAVENOUS at 19:00

## 2019-01-22 RX ADMIN — ACETAMINOPHEN 1000 MG: 10 INJECTION, SOLUTION INTRAVENOUS at 14:05

## 2019-01-22 RX ADMIN — NEOSTIGMINE METHYLSULFATE 3 MG: 1 INJECTION INTRAVENOUS at 16:45

## 2019-01-22 RX ADMIN — GLYCOPYRROLATE 0.4 MG: 0.2 INJECTION INTRAMUSCULAR; INTRAVENOUS at 17:08

## 2019-01-22 RX ADMIN — LIDOCAINE HYDROCHLORIDE 100 MG: 20 INJECTION, SOLUTION EPIDURAL; INFILTRATION; INTRACAUDAL; PERINEURAL at 13:31

## 2019-01-22 RX ADMIN — ROCURONIUM BROMIDE 20 MG: 10 INJECTION, SOLUTION INTRAVENOUS at 13:39

## 2019-01-22 RX ADMIN — SODIUM CHLORIDE, SODIUM LACTATE, POTASSIUM CHLORIDE, AND CALCIUM CHLORIDE 125 ML/HR: 600; 310; 30; 20 INJECTION, SOLUTION INTRAVENOUS at 12:56

## 2019-01-22 RX ADMIN — HYDRALAZINE HYDROCHLORIDE 5 MG: 20 INJECTION INTRAMUSCULAR; INTRAVENOUS at 18:40

## 2019-01-22 RX ADMIN — GLYCOPYRROLATE 0.6 MG: 0.2 INJECTION INTRAMUSCULAR; INTRAVENOUS at 16:45

## 2019-01-22 RX ADMIN — MIDAZOLAM HYDROCHLORIDE 2 MG: 1 INJECTION, SOLUTION INTRAMUSCULAR; INTRAVENOUS at 13:28

## 2019-01-22 RX ADMIN — ROCURONIUM BROMIDE 10 MG: 10 INJECTION, SOLUTION INTRAVENOUS at 13:31

## 2019-01-22 RX ADMIN — KETAMINE HYDROCHLORIDE 50 MG: 10 INJECTION, SOLUTION INTRAMUSCULAR; INTRAVENOUS at 13:42

## 2019-01-22 RX ADMIN — LIDOCAINE HYDROCHLORIDE ANHYDROUS AND DEXTROSE MONOHYDRATE 2 MG/KG/HR: .8; 5 INJECTION, SOLUTION INTRAVENOUS at 14:15

## 2019-01-22 RX ADMIN — ONDANSETRON 4 MG: 2 INJECTION INTRAMUSCULAR; INTRAVENOUS at 16:46

## 2019-01-22 RX ADMIN — Medication 80 MCG: at 14:30

## 2019-01-22 RX ADMIN — ALBUMIN HUMAN 250 ML: 50 SOLUTION INTRAVENOUS at 15:42

## 2019-01-22 RX ADMIN — HYDRALAZINE HYDROCHLORIDE 5 MG: 20 INJECTION INTRAMUSCULAR; INTRAVENOUS at 19:30

## 2019-01-22 RX ADMIN — GENTAMICIN SULFATE 400 MG: 40 INJECTION, SOLUTION INTRAMUSCULAR; INTRAVENOUS at 14:10

## 2019-01-22 RX ADMIN — SODIUM CHLORIDE, SODIUM LACTATE, POTASSIUM CHLORIDE, CALCIUM CHLORIDE: 600; 310; 30; 20 INJECTION, SOLUTION INTRAVENOUS at 14:10

## 2019-01-22 RX ADMIN — PROPOFOL 200 MG: 10 INJECTION, EMULSION INTRAVENOUS at 13:31

## 2019-01-22 RX ADMIN — FENTANYL CITRATE 100 MCG: 50 INJECTION, SOLUTION INTRAMUSCULAR; INTRAVENOUS at 13:31

## 2019-01-22 RX ADMIN — HYDROMORPHONE HYDROCHLORIDE 0.8 MG: 2 INJECTION, SOLUTION INTRAMUSCULAR; INTRAVENOUS; SUBCUTANEOUS at 16:22

## 2019-01-22 RX ADMIN — Medication 10 ML: at 20:00

## 2019-01-22 RX ADMIN — HYDRALAZINE HYDROCHLORIDE 5 MG: 20 INJECTION INTRAMUSCULAR; INTRAVENOUS at 18:55

## 2019-01-22 RX ADMIN — HYDROMORPHONE HYDROCHLORIDE 0.6 MG: 2 INJECTION, SOLUTION INTRAMUSCULAR; INTRAVENOUS; SUBCUTANEOUS at 17:00

## 2019-01-22 RX ADMIN — SODIUM CHLORIDE, SODIUM LACTATE, POTASSIUM CHLORIDE, CALCIUM CHLORIDE: 600; 310; 30; 20 INJECTION, SOLUTION INTRAVENOUS at 13:25

## 2019-01-22 RX ADMIN — HYDROMORPHONE HYDROCHLORIDE 0.4 MG: 2 INJECTION, SOLUTION INTRAMUSCULAR; INTRAVENOUS; SUBCUTANEOUS at 16:55

## 2019-01-22 RX ADMIN — MIRTAZAPINE 15 MG: 15 TABLET, FILM COATED ORAL at 22:44

## 2019-01-22 NOTE — BRIEF OP NOTE
BRIEF OPERATIVE NOTE    Date of Procedure: 1/22/2019   Preoperative Diagnosis: INCONTINENCE  Postoperative Diagnosis: INCONTINENCE    Procedure(s):  ARTIFICAL URINARY SPHINCTER  IMPLANTATION OF PENILE PROSTHESIS  Surgeon(s) and Role:     Vonnie Schaumann, MD - Primary     * Tamar Joyce MD - Assisting         Surgical Assistant: None    Surgical Staff:  Circ-1: Michael Najera RN  Circ-Relief: Brie Freed RN; Hu Boss RN  Scrub Tech-1: Nivia Cage  Scrub RN-Relief: Celena Wagner RN  Surg Asst-1: Magnolia Joy RN  Float Staff: Lana Persaud  Event Time In Time Out   Incision Start 1408    Incision Close 1645      Anesthesia: General   Estimated Blood Loss: 150-200mL  Specimens: * No specimens in log *   Findings:  IPP - 20+1cmRTE, Left reservoir 75mL, intraop bleeding left RP space as noted per op note   Complications: None  Implants:   Implant Name Type Inv.  Item Serial No.  Lot No. LRB No. Used Action   KIT ACCS INCONT  IZ --  - SN/A  KIT ACCS INCONT  IZ --  N/A [facility] [id number] N/A 1 Implanted   PUMP CTRL URIN PROS W/IZ --  - SN/A  PUMP CTRL URIN PROS W/IZ --  N/A [employer] [facility] [id number] N/A 1 Implanted   SPHINCTER URIN PRES 61-70CM --  - SN/A  SPHINCTER URIN PRES 61-70CM --  N/A [facility]OLOGY-WOMENS [facility] [id number] N/A 1 Implanted   CUFF OCCL URIN BELL IZ 3.5C --  - SN/A  CUFF OCCL URIN BELL IZ 3.5C --  N/A [employer] [facility] UROLOGY-WOMENS [facility] [id number] N/A 1 Implanted   KIT PENILE PROSTHS ASSEMBLY -- KOBGW [id number] - SN/A  KIT PENILE PROSTHS ASSEMBLY -- TITAN [id number] N/A [employer] [id number] N/A 1 Implanted   SET CYL/PUMP SCROT 0 DEG 20CM -- TITAN TOUCH ZERO - SN/A  SET CYL/PUMP SCROT 0 DEG 20CM -- TITAN TOUCH ZERO N/A [employer] [id number] N/A 1 Implanted   IMPL PROS PENILE 75ML --  - SN/A  IMPL PROS PENILE 75ML --  N/A [employer] [id number] N/A 1 Implanted

## 2019-01-22 NOTE — ROUTINE PROCESS
Patient: Bryant Childs MRN: [id number]  SSN: [id number]   YOB: 1960  Age: 62 y.o. Sex: male     Patient is status post Procedure(s):  ARTIFICAL URINARY SPHINCTER WITH IMPLANTATION OF PENILE PROSTHESIS. Surgeon(s) and Role:     Rhiannon Lima MD - Primary     * Aby Fernandez MD - Assisting    Local/Dose/Irrigation:                    Peripheral IV 01/22/19 Left Wrist (Active)            Airway - Endotracheal Tube 01/22/19 Oral (Active)                   Dressing/Packing:  Wound Scrotum-DRESSING TYPE: Gauze wrap (pratik); Special tape (comment)(Hypafix) (01/22/19 1030)  Splint/Cast:  ]    Other:

## 2019-01-22 NOTE — OP NOTES
[facility] REPORT    Karen Walden  MR#: [id number]  : 1960  ACCOUNT #: [de-identified]   DATE OF SERVICE: 2019    PREOPERATIVE DIAGNOSIS:  Urinary incontinence. SECONDARY DIAGNOSIS:  Erectile dysfunction. POSTOPERATIVE DIAGNOSIS:  same    PROCEDURE PERFORMED:  Insertion of artificial urinary sphincter as well as assisting on placement of inflatable penile prosthesis. SURGEON:  Elliott Krishnan. Georges Klein MD    ASSISTANT:  Marielle Dixon MD    ANESTHESIA:  gen    COMPLICATIONS:  None. IMPLANTS:  Artificial urinary sphincter. SPECIMENS REMOVED:  none    ESTIMATED BLOOD LOSS:  30cc    FINDINGS:  Retropubic scarring secondary to radiation changes. PROCEDURE:  After informed consent, the patient received preoperative antibiotics and SCDs and TEDs were placed on lower extremities. He was then placed on the operating table in supine position. After adequate induction of general anesthetic, he was placed in the low lithotomy position. All pressure points were carefully padded. The abdomen, suprapubic area and scrotum were shaved and the abdomen, penis, scrotum, perineum were prepped and draped in sterile fashion. A full timeout was accomplished. Dr. Traci Andre will dictate the penoscrotal incision for exposure using the retractor. I was able to place a 12-Macedonian Fitzpatrick with 5 mL of water in the balloon, efflux clear urine. I then was able to go as proximal in urethra through the penoscrotal incision as I could and using combination of sharp and blunt dissection encircled the urethra. I then performed retrograde irrigation of antibiotic irrigation at the meatus and distended the urethra nicely and there was no extravasation, indicating no injury to the urethra during dissection. I then was able to measure for a 3.5 cm cuff, which was placed and fit very nicely. I then was able in the right inguinal area to use a Dougie and get in the retropubic space.   It was very adhesed because of the radiation. I had to pay careful attention to very gently create a retropubic space and I was able to use antibiotic irrigation and place my reservoir, which was filled with 24 mL of saline. I then created a dartos pouch laterally in the right hemiscrotal area and placed my pump. I then was able to do a figure-of-eight 3-0 Vicryl pursestring to keep it in place. I then trimmed off the excess tubing using Quick Connect to connect all the appropriate tubing, cycled the device several times and then left it in the off position. I then assisted Dr. Roshni Flores. I did assist him in developing a space for his reservoir and did encounter some venous bleeding in the left retropubic area. A few figure-of-eight 3-0 Vicryl sutures allowed us to gain quick control and we had complete hemostasis. He will dictate the rest of his procedure. MD KETTY Egan / [place]  D: 01/22/2019 16:58     T: 01/22/2019 17:48  JOB #: [id number]  CC: Kavya Griffith MD  CC: Primary Care Physician  CC: Trinity Heaton MD

## 2019-01-22 NOTE — H&P
Danuta Jha is a 59year old male who presents today for \"discuss surgery\". He returns for follow-up. He returns today for follow-up. History of stress urinary incontinence post prostatectomy followed by radiation therapy. Also erectile dysfunction currently being evaluated by doctors about it for a penile implant. Presents with urodynamic studies that revealed low capacity 213 cc with a very low leak point pressure of 49 cm of water. Good detrusor function with elevated pressures however most likely artifact. Patient had a cystoscopic examination September that revealed no bladder neck contracture or urethral stricture disease. Patient states he voids with a strong stream.  No further episodes of hematuria noted. He uses 2 full pads a day with minimal incontinence at night. Currently on tolterodine. PAST MEDICAL HISTORY:    Allergies: No known allergies. DENIES: Latex, Shellfish, X-Ray Dye, Iodine.      Medications: ASPIR-LOW 81 MG ORAL TABLET DELAYED RELEASE (ASPIRIN)   TOLTERODINE TART ER 4MG CAPSULES (TOLTERODINE TARTRATE) TAKE 1 CAPSULE BY MOUTH EVERY DAY  VIAGRA 100 MG ORAL TABLET (SILDENAFIL CITRATE) 1 po qd  TRAZODONE  MG ORAL TABLET (TRAZODONE HCL)   METOPROLOL SUCCINATE ER 50 MG ORAL TABLET EXTENDED RELEASE 24 HOUR (METOPROLOL SUCCINATE)   BUPROPION HCL ER (SR) 200 MG ORAL TABLET EXTENDED RELEASE 12 HOUR (BUPROPION HCL)   LISINOPRIL-HYDROCHLOROTHIAZIDE 10-12.5 MG ORAL TABLET (LISINOPRIL-HYDROCHLOROTHIAZIDE) daily  FLUOXETINE HCL 20 MG ORAL TABLET (FLUOXETINE HCL) daily  CLONAZEPAM TABLET (CLONAZEPAM TABS) daily    Problems: Male urinary stress incontinence (OBG-859.04) (TNQ58-K93.3)  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) (MAR88-F46.40)  Dysuria (ICD-788.1) (BDX96-Z87.0)  Gross hematuria (ICD-599.71) (SEA24-F08.0)  Urinary Frequency (ICD-788.41) (XEN26-Y61.0)  Personal history of prostate cancer (ICD-V10.46) (JEU95-L67.92)  INCONTINENCE (ICD-788.32) (VTJ42-S47.3)  PROSTATE ADENOCARCINOMA (ICD-185) (MBU10-B79)  Erectile dysfunction, organic (ICD-607.84) (NKE08-J44.9)  Elevated prostate specific antigen [PSA] (RHU62-X56.20)    Illnesses: Alcoholism, vitamin D deficiency, anxiety disorder, dyslipidemia, Heart Disease, Diabetes, High Blood Pressure, Bowel Problems, Stroke/Seizure, Kidney Problems, Bleeding Problems, and Cancer. DENIES: Pacemaker/Defibrillator, Lung Disease, HIV, or Hepatitis. Surgeries: Bladder Surgery, Prostate Biopsy, and Prostatectomy. Family History: DENIES: Prostate cancer, Kidney cancer, Kidney disease, Kidney stones. Social History: Full Time. . Smoking status: never smoker. Does not drink alcohol. System Review: Admits to: Dry Eyes, Dry Mouth, Shortness of Breath, Constipation, Involuntary Urine Loss, Lower Extremity Weakness, Pyschiatric Problems, and Impaired Sex Drive. DENIES: Unexplained Weight Loss, Leg Swelling, Dry Skin, Difficulty Walking, Psychiatric Problems, Easy Bleeding, Rash. EXAMINATION: Appearance: well-developed NAD Respiratory Effort: breathing easily Orientation: oriented to person; time and place Mood/Affect: normal       URINALYSIS    PSA HISTORY  <0.1 ng/ml on 07/16/2018  <0.1 ng/ml on 04/09/2018  <0.1 ng/ml on 01/08/2018    IMPRESSION:    1. MALE URINARY STRESS INCONTINENCE (YZI38-Y35.3) - Unchanged: The risks, alternatives as well as benefits of the artificial urinary sphincter insertion including but not limited to bleeding, infection, failure, infection necessitating removal of the device, DVT, PE, risk of transfusion,mecanical failure of the device were discussed in detail with the patient and he understands and wishes to proceed. He'll have to stop his aspirin 7 days before the procedure.

## 2019-01-22 NOTE — ANESTHESIA PREPROCEDURE EVALUATION
Anesthetic History No history of anesthetic complications Review of Systems / Medical History Patient summary reviewed, nursing notes reviewed and pertinent labs reviewed Pulmonary Sleep apnea: CPAP Neuro/Psych Within defined limits Cardiovascular Hypertension: well controlled GI/Hepatic/Renal 
  
 
 
 
Liver disease Endo/Other Within defined limits Other Findings Physical Exam 
 
Airway Mallampati: II 
TM Distance: > 6 cm Neck ROM: normal range of motion Mouth opening: Normal 
 
 Cardiovascular Regular rate and rhythm,  S1 and S2 normal,  no murmur, click, rub, or gallop Dental 
No notable dental hx Pulmonary Breath sounds clear to auscultation Abdominal 
GI exam deferred Other Findings Anesthetic Plan ASA: 2 Anesthesia type: general 
 
 
 
 
Induction: Intravenous Anesthetic plan and risks discussed with: Patient

## 2019-01-22 NOTE — OP NOTES
Preop diagnosis: Erectile dysfunction and Incontinence  Postoperative diagnosis: Same. Procedure:  IMPLANTATION 3 PIECE PENILE PROSTHESIS  Device details: Robel Arteaga 20+1CM RTE; 75ML RESERVOIR LEFT RP SPACE. Surgeon: Hamilton Caceres M.D. Assistant: Rick Moya. Antibiotics: Rocephin / Peggi Plater  GGNZE:[id number] Fitzpatrick  Complications: None. EBL: 150-200mL    Indications:Established male with chronic erectile dysfunction. Multiple noninvasive therapies attempted. Full discussion regarding risks and benefits of penile prosthesis. Expected outcomes regarding risk for infection, bleeding. Expected penile length discussed with patient. Patient signed informed consent after complete discussion of above concerns. Operation: The patient was brought to the operating room and placed in supine position. General anesthesia was induced. Informed consent had been obtained. A timeout was performed. Preoperative antibiotics had been administered. The patient was prepped and draped in a sterile fashion with DuraPrep. 12 French Fitzpatrick catheter was placed and the bladder was drained. A penoscrotal incision was performed and carried down to the corpora. A [place] retractor was then placed. Septal attachments to the urethra were taken down with cautery as needed. Initial dissection in conjunction with Dr. Roman Stafford. He performed majority of his urethral dissection and pump space first.    Then the corpora were cleaned off and then 2-0 Vicryl stitches were placed medially and laterally. A corporotomy was made on each side. A Metzenbaum scissor was then utilized to bluntly dissect the space distally toward the glans staying lateral. Sequential dilation was then performed with dilators from [address] [place] [address] Demetria both distally and proximally. Distal dilation was to the mid glans. Proximal dilation was equal on both sides and was without perforation or complication.  Measuring was then performed and demonstrated:  Right-side -  Distal 11.5cm Proximal 9.5cm   Left side   -   Distal 11.5cm   Proximal 9.5cm   Irrigation with bacitracin solution was then performed and demonstrated no perforation (ie passage to urethra, etc). The device was chosen according to measured lengths and prepared in a typical fashion utilizing sterile saline. The device was prepared by me and RN. The representative from the company was available. Once the device was prepared we then turned to the bedside. The device was brought to the bedside and oriented correctly. The Gallo passer was then utilized to pass the needle through the mid glans without trauma to the urethra. The cylinder was then placed proximally first and then distally without problem. The contralateral side was then placed as well. The fascia was then closed by tying the 2-0 Vicryl stitches to one another. Additional sutures were utilized to close any corporal defect. The tubing exited the corporotomy at the proximal portion without any abnormality. The pump and associated tubing was covered and pulled laterally. Dissection into the space of Retzius was performed through the external inguinal ring on the right side. Bleeding in LEFT RP space identified. Please see dictation from Dr. Aly Silk note for full details. The reservoir that had been prepared was then placed into the retroperitoneal space and filled to 75 mL. No herniation was noted. It was oriented properly. The space for the pump was then performed in a subcutaneous position dependently within the scrotum. Chromic stitches were then utilized to isolate the pump within its pouch. The connections were then performed to between the pump and the reservoir utilizing the collettes +/- crimping device. The device was checked to insure that it worked correctly without leak. There was no difficulty utilizing the pump. The prosthesis was straight and to the mid glans. The prosthesis cylinders were equidistant. Hemostasis was checked.  There were no bleeding issues. The tubing was then placed deep and approximating 2-0 Vicryl stitches were then utilized in a running fashion to gather and deepen the tubing. An additional layer with 2-0 Vicryl stitch was then run. A subcuticular layer was then performed with 3-0 Monocryl. Dermabond was then placed. A mummy wrap was then performed utilizing a 4 inch Kerlix. DISPO - Decided to keep patient postop for obs. Plan to DC home tomorrow.

## 2019-01-22 NOTE — ANESTHESIA POSTPROCEDURE EVALUATION
Procedure(s): ARTIFICAL URINARY SPHINCTER WITH IMPLANTATION OF PENILE PROSTHESIS. 
 
<BSHSIANPOST> Visit Vitals /79 Pulse 80 Temp 36.2 °C (97.2 °F) Resp 12 Ht 5' 6\" (1.676 m) Wt 80.3 kg (177 lb) SpO2 100% BMI 28.57 kg/m²

## 2019-01-22 NOTE — BRIEF OP NOTE
BRIEF OPERATIVE NOTE    Date of Procedure: 1/22/2019   Preoperative Diagnosis: INCONTINENCE  Postoperative Diagnosis: INCONTINENCE    Procedure(s):  ARTIFICAL URINARY SPHINCTER WITH IMPLANTATION OF PENILE PROSTHESIS  Surgeon(s) and Role:     Domingo Newton MD - Primary     * Mattie Chamberlain MD - Assisting           Surgical Staff:  Circ-1: Tiara Paige RN  Circ-Relief: Juany Ngo RN; Stephen Davila RN  Scrub Tech-1: Gay Kocher  Scrub RN-Relief: Ronny Fabry, RN  Surg Asst-1: Lee Ann Lomax RN  Float Staff: Fercho Kirk  Event Time In Time Out   Incision Start 1408    Incision Close 1645      Anesthesia: General   Estimated Blood Loss: 100cc  Specimens: * No specimens in log *   Findings: scarring in the retropubic space secondary to radiation changes   Complications: none  Implants:   Implant Name Type Inv.  Item Serial No.  Lot No. LRB No. Used Action   KIT ACCS INCONT  IZ --  - SN/A  KIT ACCS INCONT  IZ --  N/A [facility] [id number] N/A 1 Implanted   PUMP CTRL URIN PROS W/IZ --  - SN/A  PUMP CTRL URIN PROS W/IZ --  N/A [facility] [id number] N/A 1 Implanted   SPHINCTER URIN PRES 61-70CM --  - SN/A  SPHINCTER URIN PRES 61-70CM --  N/A [facility] [id number] N/A 1 Implanted   CUFF OCCL URIN BELL IZ 3.5C --  - SN/A  CUFF OCCL URIN BELL IZ 3.5C --  N/A [facility]OLOGY-[facility] [id number] N/A 1 Implanted   KIT PENILE PROSTHS ASSEMBLY -- [id number] - SN/A  KIT PENILE PROSTHS ASSEMBLY -- TITAN [id number] N/A COLOPLAST ROBBIN [id number] N/A 1 Implanted   SET CYL/PUMP SCROT 0 DEG 20CM -- TITAN TOUCH ZERO - SN/A  SET CYL/PUMP SCROT 0 DEG 20CM -- TITAN TOUCH ZERO N/A COLOPLAST ROBBIN [id number] N/A 1 Implanted   IMPL PROS PENILE 75ML --  - SN/A  IMPL PROS PENILE 75ML --  N/A COLOPLAST [id number] N/A 1 Implanted

## 2019-01-22 NOTE — PERIOP NOTES
Patient: Marcio Dash MRN: [id number]  SSN: [id number]   YOB: 1960  Age: 62 y.o. Sex: male     Patient is status post Procedure(s):  ARTIFICAL URINARY SPHINCTER WITH IMPLANTATION OF PENILE PROSTHESIS. Surgeon(s) and Role:     Rhianna Ayala MD - Primary     * Margaret Upton, Jacy Cuba MD - Assisting    Local/Dose/Irrigation:  No local given. Peripheral IV 01/22/19 Left Wrist (Active)            Airway - Endotracheal Tube 01/22/19 Oral (Active)               Dressing/Packing:  Wound Scrotum-DRESSING TYPE: Gauze wrap (pratik); Special tape (comment)(Hypafix) (01/22/19 0435)

## 2019-01-22 NOTE — H&P
Aye Crystal is a 59year old male who presents today for \"test results\". He returns for follow-up. Patient known to Dr. Blanche Watters with a history of prostatectomy 2016 with subsequent salvage EBRT. Has developed stress incontinence treated with Detrol with plans for possible sphincter with Dr. Corinne Revere. Has had erectile dysfunction refractory to PDE 5 inhibitors and with poor response to penile injection therapy. We talked today about patient's goals and plans for potential procedure. We talked about potential implant devices including inflatable versus malleable versus observation versus continued injection therapies. He has had some noted issues with hematuria treatments by Dr. Blanche Watters with needs for cystoscopy. PLAN______________________________  Erectile dysfunction-persisting and refractory to medical management status post prostatectomy in EBRT. Potential options have been thoroughly outlined. Patient does have interest in inflatable penile prosthesis. Increased risk for infection is noted. Hematuria from hemorrhagic cystitis with need for cystoscopy and subsequent increased infectious risk is noted. Possibility for ectopic reservoir placement given prior pelvic surgery with subsequent radiation therapy. Patient counseled regarding risks/benefits of penile prosthesis implantation. Combined case infection rate elevation related to single implant noted. Alternatives were discussed. Malleable, two-piece and three-piece implants covered. Expectations regarding implantation covered - expected penile length/girth, lack of glans engorgement, convalescence after surgery. Risks include infection, bleeding, device malfunction, need for revision or repair, erosion, injury to bowel,bladder,vessels, irreversibility. Lack of spontaneous erections after implant noted. If penile curvature present, discussion regarding penile modeling and associated necessary procedures.     Advised patient of need for implantation three-piece penile prosthesis combined with AUS by Dr. Savana Bonner due to refractory erectile dysfunction. Discussed risks, benefits, alternatives. Appropriate questions answered and informational handouts given if available. Patient states understanding and agreed to proceed. It is my judgment that the patient understands the treatment plan. PAST MEDICAL HISTORY:    Allergies: No known allergies. DENIES: Latex, Shellfish, X-Ray Dye, Iodine. Medications: TOLTERODINE TART ER 4MG CAPSULES (TOLTERODINE TARTRATE) TAKE 1 CAPSULE BY MOUTH EVERY DAY  VIAGRA 100 MG ORAL TABLET (SILDENAFIL CITRATE) 1 po qd  TRAZODONE  MG ORAL TABLET (TRAZODONE HCL)   METOPROLOL SUCCINATE ER 50 MG ORAL TABLET EXTENDED RELEASE 24 HOUR (METOPROLOL SUCCINATE)   BUPROPION HCL ER (SR) 200 MG ORAL TABLET EXTENDED RELEASE 12 HOUR (BUPROPION HCL)   LISINOPRIL-HYDROCHLOROTHIAZIDE 10-12.5 MG ORAL TABLET (LISINOPRIL-HYDROCHLOROTHIAZIDE) daily  FLUOXETINE HCL 20 MG ORAL TABLET (FLUOXETINE HCL) daily  CLONAZEPAM TABLET (CLONAZEPAM TABS) daily    Problems: Male urinary stress incontinence (FLX-853.98) (RVY48-T75.3)  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) (DPZ70-S73.40)  Dysuria (ICD-788.1) (OTK24-Q69.0)  Gross hematuria (ICD-599.71) (RYI15-Z41.0)  Urinary Frequency (ICD-788.41) (HVN51-X34.0)  Personal history of prostate cancer (ICD-V10.46) (ZGW16-C19.00)  INCONTINENCE (ICD-788.32) (QVK26-U01.3)  PROSTATE ADENOCARCINOMA (ICD-185) (HJE61-H09)  Erectile dysfunction, organic (ICD-607.84) (PQS82-Y43.9)  Elevated prostate specific antigen [PSA] (RET44-U53.20)    Illnesses: Alcoholism, vitamin D deficiency, anxiety disorder, dyslipidemia, Heart Disease, Diabetes, High Blood Pressure, Bowel Problems, Stroke/Seizure, Kidney Problems, Bleeding Problems, and Cancer. DENIES: Pacemaker/Defibrillator, Lung Disease, HIV, or Hepatitis. Surgeries: Bladder Surgery, Prostate Biopsy, and Prostatectomy.        Family History: DENIES: Prostate cancer, Kidney cancer, Kidney disease, Kidney stones. Social History: Full Time. . Smoking status: never smoker. Does not drink alcohol. System Review: Admits to: Dry Eyes, Dry Mouth, Shortness of Breath, Involuntary Urine Loss, Dry Skin, Pyschiatric Problems, and Impaired Sex Drive. DENIES: Unexplained Weight Loss, Leg Swelling, Constipation, Lower Extremity Weakness, Difficulty Walking, Psychiatric Problems, Easy Bleeding, Rash. Scrotum: without lesions   Testes: bilaterally non-tender, no masses   Epididymes: bilaterally no masses palpated, non-tender   Penis: no plaques; no lesions   Meatus: patent         URINALYSIS  Urine Micro not done    PSA HISTORY  <0.1 ng/ml on 07/16/2018  <0.1 ng/ml on 04/09/2018  <0.1 ng/ml on 01/08/2018    IMPRESSION:    1.  Erectile dysfunction - organic (VVC39-F29.9)        cc: MOISES Poole  Transcribed by Speech to Text Technology  [employer]  [employer]

## 2019-01-23 VITALS
BODY MASS INDEX: 28.45 KG/M2 | WEIGHT: 177 LBS | TEMPERATURE: 98.1 F | DIASTOLIC BLOOD PRESSURE: 83 MMHG | OXYGEN SATURATION: 92 % | HEART RATE: 91 BPM | RESPIRATION RATE: 16 BRPM | SYSTOLIC BLOOD PRESSURE: 152 MMHG | HEIGHT: 66 IN

## 2019-01-23 PROCEDURE — 90471 IMMUNIZATION ADMIN: CPT

## 2019-01-23 PROCEDURE — 74011250637 HC RX REV CODE- 250/637: Performed by: UROLOGY

## 2019-01-23 PROCEDURE — 90686 IIV4 VACC NO PRSV 0.5 ML IM: CPT | Performed by: UROLOGY

## 2019-01-23 PROCEDURE — 74011250636 HC RX REV CODE- 250/636: Performed by: UROLOGY

## 2019-01-23 PROCEDURE — 99218 HC RM OBSERVATION: CPT

## 2019-01-23 RX ORDER — ATORVASTATIN CALCIUM 80 MG/1
80 TABLET, FILM COATED ORAL
COMMUNITY

## 2019-01-23 RX ADMIN — Medication 10 ML: at 14:00

## 2019-01-23 RX ADMIN — FLUOXETINE 40 MG: 20 CAPSULE ORAL at 08:23

## 2019-01-23 RX ADMIN — OXYCODONE HYDROCHLORIDE 5 MG: 5 TABLET ORAL at 04:04

## 2019-01-23 RX ADMIN — OXYCODONE HYDROCHLORIDE 5 MG: 5 TABLET ORAL at 08:22

## 2019-01-23 RX ADMIN — CLONAZEPAM 2 MG: 1 TABLET ORAL at 08:22

## 2019-01-23 RX ADMIN — METOPROLOL SUCCINATE 200 MG: 50 TABLET, EXTENDED RELEASE ORAL at 10:07

## 2019-01-23 RX ADMIN — HYDRALAZINE HYDROCHLORIDE 50 MG: 25 TABLET, FILM COATED ORAL at 08:23

## 2019-01-23 RX ADMIN — BUPROPION HYDROCHLORIDE 200 MG: 100 TABLET, FILM COATED, EXTENDED RELEASE ORAL at 10:07

## 2019-01-23 RX ADMIN — ACETAMINOPHEN 650 MG: 325 TABLET ORAL at 11:46

## 2019-01-23 RX ADMIN — Medication 10 ML: at 06:15

## 2019-01-23 RX ADMIN — OXYCODONE HYDROCHLORIDE 5 MG: 5 TABLET ORAL at 15:28

## 2019-01-23 RX ADMIN — INFLUENZA VIRUS VACCINE 0.5 ML: 15; 15; 15; 15 SUSPENSION INTRAMUSCULAR at 10:14

## 2019-01-23 NOTE — PROGRESS NOTES
Urology Progress Note      Active Problems:    Stress incontinence, male (1/22/2019)        Admitting MD (and procedure) = Jose Ratliff MD - Procedure(s):  ARTIFICAL URINARY SPHINCTER WITH IMPLANTATION OF PENILE PROSTHESIS   Established Urologist: YRIS  Primary care MD: Kalyani Munoz MD  - None  Code state: Prior    ____________________________________________________________________________  ____________________________________________________________________________  ASSESSMENT/PLAN:   POD 1 AUS AND IPP - ADMITTED FOR OBS. DID WONDERFUL. IPP DEFLATED. ACHARYA CAN COME OUT      ____________________________________________________________________________  ____________________________________________________________________________    Patient: Ivonne Braun MRN: [id number]  SSN: [id number]    YOB: 1960 Age: 62 y.o. Sex: male   Height: Height: 5' 6\" (167.6 cm) Weight: Weight: 80.3 kg (177 lb) BMI: Body mass index is 28.57 kg/m². PCP: Kalyani Munoz MD None None   Contact:  Primary Emergency Contact: [address], Home Phone: [phone]     _____________________________________________________________________________  Hospital Day: 2 - Admitted 1/22/2019 11:20 AM by Jose Ratliff MD Surgeon(s):  MD Tanner Melvin MD 1 Day Post-Op Procedure(s):  ARTIFICAL URINARY SPHINCTER WITH IMPLANTATION OF PENILE PROSTHESIS       _____________________________________________________________________________    SUBJECTIVE:  Daily Progress Note: 1/23/2019 7:48 AM    Ivonne Braun is 1 Day Post-Op     Feels well. Pain okay. No numbness in legs, arms, or other.     Current Antimicrobial Therapy (168h ago, onward)    Ordered     Start Stop    01/22/19 2144  vancomycin (VANCOCIN) 1,000 mg in 0.9% sodium chloride 250 mL ([id number])  1,000 mg,   IntraVENous,   EVERY 12 HOURS      01/22/19 2200 --    01/22/19 1308  rifAMPin (RIFADIN) 600 mg injection  600 mg,   IntraVENous,   DAILY Comments:  600 mg vial only. Do not constitute or mix.    19 1400 19 0859    19 1706  trimethoprim-sulfamethoxazole (BACTRIM DS) 160-800 mg per tablet  1 Tab,   Oral,   2 TIMES DAILY      19 0000 19 9748          Diet: DIET REGULAR    OBJECTIVE:  Visit Vitals  /79 (BP 1 Location: Left arm, BP Patient Position: At rest)   Pulse 90   Temp 97.9 °F (36.6 °C)   Resp 16   Ht 5' 6\" (1.676 m)   Wt 80.3 kg (177 lb)   SpO2 95%   BMI 28.57 kg/m²      Temp (24hrs), Av °F (36.7 °C), Min:97.2 °F (36.2 °C), Max:98.6 °F (37 °C)    Intake and Output:  DIET REGULAR   1901 -  0700  In: 7304 [I.V.:1650]  Out: 1200 [Urine:1000]  No intake/output data recorded. Physical Exam:     Abd soft nt nd  IPP deflated. INCIS CDI  No ecchymosis or other  Legs WNL, no edema. Lab/Data Review:  Recent Results (from the past 24 hour(s))   POC CHEM8    Collection Time: 19  1:52 PM   Result Value Ref Range    Calcium, ionized (POC) 1.14 1.12 - 1.32 mmol/L    Sodium (POC) 145 136 - 145 mmol/L    Potassium (POC) 3.4 (L) 3.5 - 5.1 mmol/L    Chloride (POC) 103 98 - 107 mmol/L    CO2 (POC) 26 21 - 32 mmol/L    Anion gap (POC) 21 (H) 10 - 20 mmol/L    Glucose (POC) 91 65 - 100 mg/dL    BUN (POC) 11 9 - 20 mg/dL    Creatinine (POC) 1.2 0.6 - 1.3 mg/dL    GFRAA, POC >60 >60 ml/min/1.73m2    GFRNA, POC >60 >60 ml/min/1.73m2    Hematocrit (POC) 35 (L) 36.6 - 50.3 %    Comment Comment Not Indicated. Cultures:    All Micro Results     None          Signed By: Debbie Keane MD                         2019

## 2019-01-23 NOTE — PERIOP NOTES
TRANSFER - OUT REPORT:    Verbal report given to simona (name) on [employer]  being transferred to [address] [place] [address] (unit) for routine post - op       Report consisted of patients Situation, Background, Assessment and   Recommendations(SBAR). Time Pre op antibiotic given:1410  Anesthesia Stop time: 3124  Fitzpatrick Present on Transfer to floor:yes  Order for Fitzpatrick on Chart:yes  Discharge Prescriptions with Chart:yes    Information from the following report(s) SBAR, Kardex, OR Summary, Intake/Output, MAR, Med Rec Status and Cardiac Rhythm SR was reviewed with the receiving nurse. Opportunity for questions and clarification was provided. Is the patient on 02? YES       L/Min 2L          Is the patient on a monitor? NO    Is the nurse transporting with the patient? NO    Surgical Waiting Area notified of patient's transfer from PACU? YESp      The following personal items collected during your admission accompanied patient upon transfer:   Dental Appliance: Dental Appliances: None  Vision:    Hearing Aid:    Jewelry:    Clothing: Clothing: Other (comment)(clothing)  Other Valuables:  Other Valuables: Eyeglasses  Valuables sent to safe:

## 2019-01-23 NOTE — PROGRESS NOTES
Bedside and Verbal shift change report given to Sherry Medel RN (oncoming nurse) by Amrik Edwards RN (offgoing nurse). Report included the following information SBAR, Kardex, Procedure Summary, Intake/Output, MAR and Recent Results.

## 2019-01-23 NOTE — DISCHARGE INSTRUCTIONS
Discharge Instructions:  Nicholas Mullins. Austyn Saxon    Call  [facility] for appointment if not already scheduled [phone]  Activity:  Walk regularly. No lifting more than 5 to 10 pounds for 3 weeks. Light aerobic activity is okay when you feel up to it. You may resume driving when not taking pain meds    Work:    You may return to work in 2 or 3 weeks to light activity. Diet:    You may resume normal diet after 24 hours. Anesthesia and narcotics may cause nausea and vomiting. If persistent please call the office. Wound Care: You have a special dressing called Dermabond. It is okay to shower and let the water run over the incisions but do not scrub the area or soak in a tub. If you have a small amount of drainage you may place a dry bandage over the wound and change it daily. If you experience a lot of drainage, develop redness around the wound, or a fever over 101 F occurs please call the office. Medications:    Resume home medications as indicated on the Medical Reconciliation form. Aspirin and Coumadin can be restarted in 7 days if you were taking them preoperatively. If taking Plavix do not restart it until post operative day 2. Pain medications:  Non steroidal antiinflammatories seem to work best for post surgical pain. Try these first as prescribed. A narcotic prescription will also be given for breakthrough pain. Over the counter stool softeners and laxatives may be used if needed. Do not hesitate to call with questions or concerns. If you experience difficulty urinating please call the office        Post-Operative Instructions for Penile Prosthesis    Diet  There are no diet restrictions. Recommend balanced meals that include lean meats, green and yellow vegetables, citrus fruits, dairy products and whole wheat bread and grains as these promote tissue healing. Medications  Normally, you are sent home with two prescriptions.   Be sure to get these filled promptly after your discharge from the hospital/surgery center. One is for an antibiotic. Take all of the antibiotics as prescribed. Call Dr. Roshni Flores if side effects arise. The other is for pain medication. Take when needed, according to prescription instructions. We recommend using Tylenol (acetaminophen) for pain first and use narcotic pain medication only as needed. A laxative is suggested to prevent constipation. Colace over-the-counter is effective. One tablet twice a day while using the narcotic prescription. Call our office if diarrhea or loose stools occur. All regularly prescribed medications may be resumed upon discharge, except blood thinners (ex: Coumadin, aspirin, ibuprofen). If you take blood thinner medication and were not advised about resuming this drug at discharge, please contact a nurse or physician before resuming. Wound Care  Keep your incisions clean and dry. If your incision is on the underside of your penis, you should keep your penis up against your abdomen, held there by support underwear. Initially use ice or cold pack to prevent/treat swelling. No more than 20minutes at a time. Use a barrier (sheet/towel) to prevent tissue injury. Briefs or bicycle/exercise tights can help stabilize the area and decrease swelling. Contact us if:  You notice excessive swelling or redness, red streaks, drainage from the incision, or extreme tenderness. You are unable to urinate, or if your temperature reaches 101.5F. You are having pain that is not being relieved by the medication prescribed for you. Swelling or warmth limited to one leg or arm. Call the office if between 8:00am and 4:30pm.  Call overnight line at [phone] from 4:30pm through 8:00am.    Activity  Refrain from driving for 3-5 days or while using narcotics. No heavy lifting (>15lbs) or strenuous exercise for 2 weeks. You may return to work at your discretion, usually after 1 to 2 weeks.   No sexual activity until cleared by Dr. Jairo Armas. He will give you teaching on usage of the device during followup. Bathing  Warm soaks may help for pain relief and tissue healing. They may be started 72 hours after surgery, two times daily for 15 to 20 minutes. You can shower in 48 hours but do not wash the wound site with soap. No baths or swimming for 2 weeks.       [facility]  Contact Information  Daytime 8AM - 4:30PM  [facility]  [phone] [place] [facility]  [phone]   After hours - [phone]

## 2019-01-29 ENCOUNTER — HOSPITAL ENCOUNTER (EMERGENCY)
Age: 59
Discharge: HOME | End: 2019-01-29
Attending: EMERGENCY MEDICINE
Payer: OTHER GOVERNMENT

## 2019-01-29 ENCOUNTER — APPOINTMENT (OUTPATIENT)
Dept: CT IMAGING | Age: 59
End: 2019-01-29
Attending: EMERGENCY MEDICINE
Payer: OTHER GOVERNMENT

## 2019-01-29 VITALS
TEMPERATURE: 98.8 F | OXYGEN SATURATION: 98 % | WEIGHT: 178 LBS | RESPIRATION RATE: 16 BRPM | DIASTOLIC BLOOD PRESSURE: 72 MMHG | HEIGHT: 66 IN | BODY MASS INDEX: 28.61 KG/M2 | SYSTOLIC BLOOD PRESSURE: 141 MMHG | HEART RATE: 87 BPM

## 2019-01-29 DIAGNOSIS — K59.00 CONSTIPATION, UNSPECIFIED CONSTIPATION TYPE: ICD-10-CM

## 2019-01-29 DIAGNOSIS — G89.18 POST-OP PAIN: Primary | ICD-10-CM

## 2019-01-29 LAB
ANION GAP SERPL CALC-SCNC: 9 MMOL/L (ref 5–15)
BASOPHILS # BLD: 0 K/UL (ref 0–0.1)
BASOPHILS NFR BLD: 0 % (ref 0–1)
BUN SERPL-MCNC: 22 MG/DL (ref 6–20)
BUN/CREAT SERPL: 15 (ref 12–20)
CALCIUM SERPL-MCNC: 8.8 MG/DL (ref 8.5–10.1)
CHLORIDE SERPL-SCNC: 107 MMOL/L (ref 97–108)
CO2 SERPL-SCNC: 27 MMOL/L (ref 21–32)
COMMENT, HOLDF: NORMAL
CREAT SERPL-MCNC: 1.48 MG/DL (ref 0.7–1.3)
DIFFERENTIAL METHOD BLD: ABNORMAL
EOSINOPHIL # BLD: 0.2 K/UL (ref 0–0.4)
EOSINOPHIL NFR BLD: 3 % (ref 0–7)
ERYTHROCYTE [DISTWIDTH] IN BLOOD BY AUTOMATED COUNT: 14.5 % (ref 11.5–14.5)
GLUCOSE SERPL-MCNC: 109 MG/DL (ref 65–100)
HCT VFR BLD AUTO: 32.6 % (ref 36.6–50.3)
HGB BLD-MCNC: 10.6 G/DL (ref 12.1–17)
IMM GRANULOCYTES # BLD AUTO: 0 K/UL (ref 0–0.04)
IMM GRANULOCYTES NFR BLD AUTO: 0 % (ref 0–0.5)
LYMPHOCYTES # BLD: 1.5 K/UL (ref 0.8–3.5)
LYMPHOCYTES NFR BLD: 20 % (ref 12–49)
MCH RBC QN AUTO: 28.7 PG (ref 26–34)
MCHC RBC AUTO-ENTMCNC: 32.5 G/DL (ref 30–36.5)
MCV RBC AUTO: 88.3 FL (ref 80–99)
MONOCYTES # BLD: 0.5 K/UL (ref 0–1)
MONOCYTES NFR BLD: 7 % (ref 5–13)
NEUTS SEG # BLD: 5.4 K/UL (ref 1.8–8)
NEUTS SEG NFR BLD: 70 % (ref 32–75)
NRBC # BLD: 0 K/UL (ref 0–0.01)
NRBC BLD-RTO: 0 PER 100 WBC
PLATELET # BLD AUTO: 323 K/UL (ref 150–400)
PMV BLD AUTO: 9.5 FL (ref 8.9–12.9)
POTASSIUM SERPL-SCNC: 3.7 MMOL/L (ref 3.5–5.1)
RBC # BLD AUTO: 3.69 M/UL (ref 4.1–5.7)
SAMPLES BEING HELD,HOLD: NORMAL
SODIUM SERPL-SCNC: 143 MMOL/L (ref 136–145)
WBC # BLD AUTO: 7.6 K/UL (ref 4.1–11.1)

## 2019-01-29 PROCEDURE — 74011250636 HC RX REV CODE- 250/636: Performed by: EMERGENCY MEDICINE

## 2019-01-29 PROCEDURE — 74011250637 HC RX REV CODE- 250/637: Performed by: EMERGENCY MEDICINE

## 2019-01-29 PROCEDURE — 80048 BASIC METABOLIC PNL TOTAL CA: CPT

## 2019-01-29 PROCEDURE — 85025 COMPLETE CBC W/AUTO DIFF WBC: CPT

## 2019-01-29 PROCEDURE — 74011636320 HC RX REV CODE- 636/320: Performed by: EMERGENCY MEDICINE

## 2019-01-29 PROCEDURE — 99283 EMERGENCY DEPT VISIT LOW MDM: CPT

## 2019-01-29 PROCEDURE — 36415 COLL VENOUS BLD VENIPUNCTURE: CPT

## 2019-01-29 PROCEDURE — 74177 CT ABD & PELVIS W/CONTRAST: CPT

## 2019-01-29 PROCEDURE — 96360 HYDRATION IV INFUSION INIT: CPT

## 2019-01-29 RX ORDER — OXYBUTYNIN CHLORIDE 5 MG/1
5 TABLET, EXTENDED RELEASE ORAL
Status: COMPLETED | OUTPATIENT
Start: 2019-01-29 | End: 2019-01-29

## 2019-01-29 RX ADMIN — SODIUM CHLORIDE 1000 ML: 900 INJECTION, SOLUTION INTRAVENOUS at 21:14

## 2019-01-29 RX ADMIN — OXYBUTYNIN CHLORIDE 5 MG: 5 TABLET, EXTENDED RELEASE ORAL at 22:39

## 2019-01-29 RX ADMIN — IOPAMIDOL 100 ML: 755 INJECTION, SOLUTION INTRAVENOUS at 20:04

## 2019-01-29 NOTE — ED PROVIDER NOTES
62 y.o. male with past medical history significant for PTSD, PSA elevation, HTN, sleep apnea, cancer, stroke, arrhythmia, psychiatric disorder, chronic kidney disease, and liver disease who presents from home with chief complaint of hemorrhoids. Patient previously had an artificial urinary sphincter with implantation of penile prosthesis procedure on 1/22/19 due to male stress incontinence. Over the past 3 days, patient complains that he has been experiencing \"bad hemorrhoids\" with associated pain and pressure. He states that his hemorrhoids are \"internal\" and denies bleeding. He claims to have tried OTC medications for his symptoms with no subsequent relief. Patient adds that he had a catheter and associated bag placed yesterday. He admits to having similar symptoms in the past and states that he has had \"this his whole life. \"  Patient denies chest pain, headache, nausea, vomiting, fever, and chills. There are no other acute medical concerns at this time. Social hx: negative tobacco, alcohol, and drug use PCP: Other, MD Kalyani 
 
Note written by Kesha Dalal, as dictated by Reshma Cooper MD 6:47 PM 
 
 
 
The history is provided by the patient. No  was used. Past Medical History:  
Diagnosis Date  Adverse effect of anesthesia AGITATED IN PACU  Arrhythmia  Cancer [facility]) 2016  
 prostate  Chronic kidney disease 2018 RECENTLY DIAGNOSED  Hypertension  Liver disease SOM[facility]ING WR[facility] WITH [facility], [facility]ING [facility] IN 1 MONTH FOR REPEAT LABS  PSA elevation  Psychiatric disorder PTSD, ANXIETY AND DEPRESSION  
 PTSD (post-traumatic stress disorder)  Sleep apnea   
 not compliant with CPAP  
 Stroke ([facility] [address] April 2017, MRI with chronic white matter Past Surgical History:  
Procedure Laterality Date  HX COLONOSCOPY  2016  HX PROSTATECTOMY  2017  HX UROLOGICAL  2018  SOME TYPE OF BLADDER SURGERY, TO CAUTERIZE BLEEDERS IN BLADDER  
 ME PROSTATE BIOPSY, NEEDLE, SATURATION SAMPLING    
 x 2 Family History:  
Problem Relation Age of Onset  Heart Disease Mother  Diabetes Mother  Hypertension Mother  Cancer Mother  Substance Abuse Sister  Psychiatric Disorder Sister  Substance Abuse Sister  No Known Problems Daughter  No Known Problems Daughter  No Known Problems Daughter  Anesth Problems Neg Hx Social History Socioeconomic History  Marital status:  Spouse name: Not on file  Number of children: Not on file  Years of education: Not on file  Highest education level: Not on file Social Needs  Financial resource strain: Not on file  Food insecurity - worry: Not on file  Food insecurity - inability: Not on file  Transportation needs - medical: Not on file  Transportation needs - non-medical: Not on file Occupational History  Not on file Tobacco Use  Smoking status: Never Smoker  Smokeless tobacco: Never Used Substance and Sexual Activity  Alcohol use: No  
 Drug use: No  
 Sexual activity: Not on file Other Topics Concern  Not on file Social History Narrative  Not on file ALLERGIES: Patient has no known allergies. Review of Systems Constitutional: Negative for chills and fever. HENT: Negative for ear pain and sore throat. Eyes: Negative for photophobia and pain. Respiratory: Negative for chest tightness and shortness of breath. Cardiovascular: Negative for chest pain and leg swelling. Gastrointestinal: Negative for anal bleeding, blood in stool, nausea and vomiting. Hemorrhoids with associated pain and pressure Genitourinary: Negative for dysuria and flank pain. Musculoskeletal: Negative for back pain and neck pain. Skin: Negative for rash and wound. Neurological: Negative for dizziness, light-headedness and headaches.   
All other systems reviewed and are negative. Vitals:  
 01/29/19 1849 BP: (!) 175/93 Pulse: 87 Resp: 16 Temp: 98.8 °F (37.1 °C) SpO2: 98% Weight: 80.7 kg (178 lb) Height: 5' 6\" (1.676 m) Physical Exam  
Constitutional: He is oriented to person, place, and time. He appears well-developed and well-nourished. No distress. HENT:  
Head: Normocephalic and atraumatic. Eyes: Conjunctivae and EOM are normal. Pupils are equal, round, and reactive to light. Neck: Normal range of motion. Cardiovascular: Normal rate, regular rhythm, normal heart sounds and intact distal pulses. No murmur heard. Pulmonary/Chest: Effort normal and breath sounds normal. No stridor. No respiratory distress. Abdominal: Soft. Bowel sounds are normal. There is no tenderness. Genitourinary:  
Genitourinary Comments: -Indwelling urinary catheter attached to leg bag 
- no external hemorrhoids noted on exam 
  
Musculoskeletal: Normal range of motion. He exhibits no edema or tenderness. Neurological: He is alert and oriented to person, place, and time. No cranial nerve deficit. Skin: Skin is warm and dry. He is not diaphoretic. Psychiatric: He has a normal mood and affect. Nursing note and vitals reviewed. MDM Number of Diagnoses or Management Options Constipation, unspecified constipation type: Post-op pain:  
Diagnosis management comments: Patient with rectal pain after surgery - check labs and get CT scan to eval for post-op issue. ddx - bleeding, infection, constipation, bladder spasms Amount and/or Complexity of Data Reviewed Clinical lab tests: ordered and reviewed Tests in the radiology section of CPT®: ordered and reviewed Discuss the patient with other providers: yes Procedures CONSULT NOTE: 
9:31 PM Javi Macedo MD spoke with Dr. Alvarez Pisano, Consult for Urology. Discussed available diagnostic tests and clinical findings.   Dr. Humberto Tineo states that there is no immediate emergency. He recommends that the patient follow-up in the office tomorrow. Dr. Marissa Wyatt also suggests oxybutin for potential bladder spasms. VITALS:  
Patient Vitals for the past 8 hrs: 
 Temp Pulse Resp BP SpO2  
01/29/19 1849 98.8 °F (37.1 °C) 87 16 (!) 175/93 98 % Recent Results (from the past 24 hour(s)) CBC WITH AUTOMATED DIFF Collection Time: 01/29/19  7:23 PM  
Result Value Ref Range WBC 7.6 4.1 - 11.1 K/uL  
 RBC 3.69 (L) 4.10 - 5.70 M/uL  
 HGB 10.6 (L) 12.1 - 17.0 g/dL HCT 32.6 (L) 36.6 - 50.3 % MCV 88.3 80.0 - 99.0 FL  
 MCH 28.7 26.0 - 34.0 PG  
 MCHC 32.5 30.0 - 36.5 g/dL  
 RDW 14.5 11.5 - 14.5 % PLATELET 710 901 - 067 K/uL MPV 9.5 8.9 - 12.9 FL  
 NRBC 0.0 0  WBC ABSOLUTE NRBC 0.00 0.00 - 0.01 K/uL NEUTROPHILS 70 32 - 75 % LYMPHOCYTES 20 12 - 49 % MONOCYTES 7 5 - 13 % EOSINOPHILS 3 0 - 7 % BASOPHILS 0 0 - 1 % IMMATURE GRANULOCYTES 0 0.0 - 0.5 % ABS. NEUTROPHILS 5.4 1.8 - 8.0 K/UL  
 ABS. LYMPHOCYTES 1.5 0.8 - 3.5 K/UL  
 ABS. MONOCYTES 0.5 0.0 - 1.0 K/UL  
 ABS. EOSINOPHILS 0.2 0.0 - 0.4 K/UL  
 ABS. BASOPHILS 0.0 0.0 - 0.1 K/UL  
 ABS. IMM. GRANS. 0.0 0.00 - 0.04 K/UL  
 DF AUTOMATED METABOLIC PANEL, BASIC Collection Time: 01/29/19  7:23 PM  
Result Value Ref Range Sodium 143 136 - 145 mmol/L Potassium 3.7 3.5 - 5.1 mmol/L Chloride 107 97 - 108 mmol/L  
 CO2 27 21 - 32 mmol/L Anion gap 9 5 - 15 mmol/L Glucose 109 (H) 65 - 100 mg/dL BUN 22 (H) 6 - 20 MG/DL Creatinine 1.48 (H) 0.70 - 1.30 MG/DL  
 BUN/Creatinine ratio 15 12 - 20 GFR est AA 59 (L) >60 ml/min/1.73m2 GFR est non-AA 48 (L) >60 ml/min/1.73m2 Calcium 8.8 8.5 - 10.1 MG/DL  
SAMPLES BEING HELD Collection Time: 01/29/19  7:23 PM  
Result Value Ref Range SAMPLES BEING HELD BLUE,GOLD COMMENT   Add-on orders for these samples will be processed based on acceptable specimen integrity and analyte stability, which may vary by analyte. Ct Abd Pelv W Cont Result Date: 1/29/2019 EXAM: CT ABD PELV W CONT INDICATION: possible perianal abscess COMPARISON: 2016 CONTRAST: 100 mL of Isovue-370. TECHNIQUE: Following the uneventful intravenous administration of contrast, thin axial images were obtained through the abdomen and pelvis. Coronal and sagittal reconstructions were generated. Oral contrast was not administered. CT dose reduction was achieved through use of a standardized protocol tailored for this examination and automatic exposure control for dose modulation. FINDINGS: LUNG BASES: Clear. INCIDENTALLY IMAGED HEART AND MEDIASTINUM: Unremarkable. LIVER: No mass or biliary dilatation. GALLBLADDER: Unremarkable. SPLEEN: No mass. PANCREAS: No mass or ductal dilatation. ADRENALS: Left adrenal fullness is unchanged. KIDNEYS: No mass, calculus, or hydronephrosis. STOMACH: Unremarkable. SMALL BOWEL: No dilatation or wall thickening. COLON: No dilatation or wall thickening. There is moderate fecal stasis in the colon. No definite perianal abscess identified APPENDIX: Unremarkable. PERITONEUM: No ascites or pneumoperitoneum. RETROPERITONEUM: No lymphadenopathy or aortic aneurysm. REPRODUCTIVE ORGANS: Patient status post right and left lower quadrant penile implants. The left balloon has an irregular contour and in the inferior pelvis URINARY BLADDER: There is a Fitzpatrick catheter in urinary bladder. The wall appears slightly thickened. There are few calcifications in the mucosa of the urinary bladder. BONES: No destructive bone lesion. ADDITIONAL COMMENTS: N/A IMPRESSION: 1. There is fecal stasis in the colon. No definite perianal abscess is identified. 2. There are bilateral penile implants. The balloon to the left is in the inferior pelvis and has a lobulated contour. There is nonspecific edema anterior to the inferior aspect of the symphysis pubis in the abdominal wall fat.  The urinary bladder wall is slightly thickened. There are calcifications in the mucosa of the urinary bladder. Fitzpatrick catheter overlies urinary bladder.

## 2019-01-29 NOTE — ED TRIAGE NOTES
Pt reports hemorrhoids x3 days. Used OTC medications without relief. Pt states \"I have had them all my life\". Denies rectal bleeding. Reports anal pain.

## 2019-01-30 NOTE — DISCHARGE INSTRUCTIONS
Patient Education        Constipation: Care Instructions  Your Care Instructions    Constipation means that you have a hard time passing stools (bowel movements). People pass stools from 3 times a day to once every 3 days. What is normal for you may be different. Constipation may occur with pain in the rectum and cramping. The pain may get worse when you try to pass stools. Sometimes there are small amounts of bright red blood on toilet paper or the surface of stools. This is because of enlarged veins near the rectum (hemorrhoids). A few changes in your diet and lifestyle may help you avoid ongoing constipation. Your doctor may also prescribe medicine to help loosen your stool. Some medicines can cause constipation. These include pain medicines and antidepressants. Tell your doctor about all the medicines you take. Your doctor may want to make a medicine change to ease your symptoms. Follow-up care is a key part of your treatment and safety. Be sure to make and go to all appointments, and call your doctor if you are having problems. It's also a good idea to know your test results and keep a list of the medicines you take. How can you care for yourself at home? · Drink plenty of fluids, enough so that your urine is light yellow or clear like water. If you have kidney, heart, or liver disease and have to limit fluids, talk with your doctor before you increase the amount of fluids you drink. · Include high-fiber foods in your diet each day. These include fruits, vegetables, beans, and whole grains. · Get at least 30 minutes of exercise on most days of the week. Walking is a good choice. You also may want to do other activities, such as running, swimming, cycling, or playing tennis or team sports. · Take a fiber supplement, such as Citrucel or Metamucil, every day. Read and follow all instructions on the label. · Schedule time each day for a bowel movement. A daily routine may help.  Take your time having your bowel movement. · Support your feet with a small step stool when you sit on the toilet. This helps flex your hips and places your pelvis in a squatting position. · Your doctor may recommend an over-the-counter laxative to relieve your constipation. Examples are Milk of Magnesia and MiraLax. Read and follow all instructions on the label. Do not use laxatives on a long-term basis. When should you call for help? Call your doctor now or seek immediate medical care if:    · You have new or worse belly pain.     · You have new or worse nausea or vomiting.     · You have blood in your stools.    Watch closely for changes in your health, and be sure to contact your doctor if:    · Your constipation is getting worse.     · You do not get better as expected. Where can you learn more? Go to http://jailene-paula.info/. Enter 21 [phone] in the search box to learn more about \"Constipation: Care Instructions. \"  Current as of: September 23, 2018  Content Version: 11.9  © 2225-7178 [employer]. Care instructions adapted under license by [employer] (which disclaims liability or warranty for this information). If you have questions about a medical condition or this instruction, always ask your healthcare professional. [place] [address] any warranty or liability for your use of this information.

## 2019-01-31 ENCOUNTER — HOSPITAL ENCOUNTER (EMERGENCY)
Age: 59
Discharge: HOME | End: 2019-01-31
Attending: EMERGENCY MEDICINE
Payer: OTHER GOVERNMENT

## 2019-01-31 ENCOUNTER — APPOINTMENT (OUTPATIENT)
Dept: CT IMAGING | Age: 59
End: 2019-01-31
Attending: PHYSICIAN ASSISTANT
Payer: OTHER GOVERNMENT

## 2019-01-31 VITALS
BODY MASS INDEX: 28.61 KG/M2 | DIASTOLIC BLOOD PRESSURE: 85 MMHG | SYSTOLIC BLOOD PRESSURE: 160 MMHG | OXYGEN SATURATION: 98 % | HEIGHT: 66 IN | HEART RATE: 80 BPM | WEIGHT: 178 LBS | RESPIRATION RATE: 18 BRPM | TEMPERATURE: 98.6 F

## 2019-01-31 DIAGNOSIS — R39.198 DIFFICULTY URINATING: ICD-10-CM

## 2019-01-31 DIAGNOSIS — N48.89 PENILE PAIN: Primary | ICD-10-CM

## 2019-01-31 PROCEDURE — 74011000250 HC RX REV CODE- 250: Performed by: EMERGENCY MEDICINE

## 2019-01-31 PROCEDURE — 51702 INSERT TEMP BLADDER CATH: CPT

## 2019-01-31 PROCEDURE — 74011250637 HC RX REV CODE- 250/637: Performed by: PHYSICIAN ASSISTANT

## 2019-01-31 PROCEDURE — 77030034696 HC CATH URETH FOL 2W BARD -A

## 2019-01-31 PROCEDURE — 74011250636 HC RX REV CODE- 250/636: Performed by: PHYSICIAN ASSISTANT

## 2019-01-31 PROCEDURE — 96372 THER/PROPH/DIAG INJ SC/IM: CPT

## 2019-01-31 PROCEDURE — 77030034850

## 2019-01-31 PROCEDURE — 99283 EMERGENCY DEPT VISIT LOW MDM: CPT

## 2019-01-31 PROCEDURE — 74176 CT ABD & PELVIS W/O CONTRAST: CPT

## 2019-01-31 RX ORDER — DIAZEPAM 5 MG/1
5 TABLET ORAL
Status: COMPLETED | OUTPATIENT
Start: 2019-01-31 | End: 2019-01-31

## 2019-01-31 RX ORDER — LIDOCAINE HYDROCHLORIDE 20 MG/ML
JELLY TOPICAL ONCE
Status: COMPLETED | OUTPATIENT
Start: 2019-01-31 | End: 2019-01-31

## 2019-01-31 RX ORDER — KETOROLAC TROMETHAMINE 30 MG/ML
30 INJECTION, SOLUTION INTRAMUSCULAR; INTRAVENOUS
Status: COMPLETED | OUTPATIENT
Start: 2019-01-31 | End: 2019-01-31

## 2019-01-31 RX ADMIN — LIDOCAINE HYDROCHLORIDE: 20 JELLY TOPICAL at 19:37

## 2019-01-31 RX ADMIN — KETOROLAC TROMETHAMINE 30 MG: 30 INJECTION INTRAMUSCULAR; INTRAVENOUS at 20:34

## 2019-01-31 RX ADMIN — DIAZEPAM 5 MG: 5 TABLET ORAL at 21:18

## 2019-02-01 NOTE — ED PROVIDER NOTES
62 y.o. male with past medical history significant for PTSD, CKD, stroke, prostate cancer, and HTN who presents with chief complaint of dysuria. Pt complains of difficulty urinating with increased pressure today and states he has had difficulty since placement of penile implant. Pt states he has dribbling and was last able to urinate at 11:30 AM today. Pt states he has had catheters previously placed by his urologist and is on antibiotic for this issue. Pt denies penile pain, or testicular pain. There are no other acute medical concerns at this time. Social hx: Never Smoker. Denies EtOH Use. PCP: Tammy, MD Kalyani 
 
I have evaluated the patient as the Provider in Triage. I have reviewed His vital signs and the triage nurse assessment. I have talked with the patient and any available family and advised that I am the provider in triage and have ordered the appropriate study to initiate their work up based on the clinical presentation during my assessment. I have advised that the patient will be accommodated in the Main ED as soon as possible. I have also requested to contact the triage nurse or myself immediately if the patient experiences any changes in their condition during this brief waiting period. Note written by Gaby Carlson [facility], as dictated by Isrrael Yee. Melba Trinidad MD 7:20 PM 
 
---------------------------------------------------------------------------------- 
 
62 y.o. male with past medical history significant for HTN, prostate cancer who presents ambulatory accompanied by his Wife with chief complaint of urinary retention. Pt states he recently underwent surgery for an artificial urinary sphincter with implantation of penile prosthesis on 1/22/19 due to male stress incontinence by Dr. Ney Goel and Dr. Jami Roy [facility]). Pt states since the surgery he has had issues with difficultly urinating with associated pressuring bladder pain and dysuria.  He notes he was seen in the ED for pain on 1/29 and discharged; pt followed up with Urology this morning where he had his urinary catheter removed. Pt states a couple hours later at 1130, he was unable to urinate with progressively worsening bladder pain. Last catheter was placed on 1/30 by Urology. Pt states he did not call [facility]'s office today when his sxs onset. His next scheduled follow up appointment is on 2/4. Pt specifically denies any fever, chills, nausea, vomiting, diarrhea. There are no other acute medical concerns at this time. PMHx: Significant for PTSD, HTN, Sleep apnea, Prostate cancer, Stroke, anxiety, depression, CKD PSHx: Significant for Bladder Surgery, Prostate Biopsy, and Prostatectomy in 2016, colonoscopy Social Hx: denies tobacco use, denies any current EtOH use, denies Illicit Drug use PCP: Kalyani Munoz MD 
 
Note written by Kesha Rooney, as dictated by Stan Evans PA-C 8:09 PM 
 
 
The history is provided by the patient. No  was used. Past Medical History:  
Diagnosis Date  Adverse effect of anesthesia AGITATED IN PACU  Arrhythmia  Cancer [facility]) 2016  
 prostate  Chronic kidney disease 2018 RECENTLY DIAGNOSED  Hypertension  Liver disease [facility] IN 1 MONTH FOR REPEAT LABS  PSA elevation  Psychiatric disorder PTSD, ANXIETY AND DEPRESSION  
 PTSD (post-traumatic stress disorder)  Sleep apnea   
 not compliant with CPAP  
 Stroke ([facility] [address] April 2017, MRI with chronic white matter Past Surgical History:  
Procedure Laterality Date  HX COLONOSCOPY  2016  HX PROSTATECTOMY  2017  HX UROLOGICAL  2018 SOME TYPE OF BLADDER SURGERY, TO CAUTERIZE BLEEDERS IN BLADDER  
 [place] PROSTATE BIOPSY, NEEDLE, SATURATION SAMPLING    
 x 2 Family History:  
Problem Relation Age of Onset  Heart Disease Mother  Diabetes Mother  Hypertension Mother  Cancer Mother  Substance Abuse Sister  Psychiatric Disorder Sister  Substance Abuse Sister  No Known Problems Daughter  No Known Problems Daughter  No Known Problems Daughter  Anesth Problems Neg Hx Social History Socioeconomic History  Marital status:  Spouse name: Not on file  Number of children: Not on file  Years of education: Not on file  Highest education level: Not on file Social Needs  Financial resource strain: Not on file  Food insecurity - worry: Not on file  Food insecurity - inability: Not on file  Transportation needs - medical: Not on file  Transportation needs - non-medical: Not on file Occupational History  Not on file Tobacco Use  Smoking status: Never Smoker  Smokeless tobacco: Never Used Substance and Sexual Activity  Alcohol use: No  
 Drug use: No  
 Sexual activity: Not on file Other Topics Concern  Not on file Social History Narrative  Not on file ALLERGIES: Patient has no known allergies. Review of Systems Constitutional: Negative for fever. Eyes: Negative for visual disturbance. Respiratory: Negative for cough, shortness of breath and wheezing. Cardiovascular: Negative for chest pain and leg swelling. Gastrointestinal: Positive for abdominal pain. Negative for diarrhea, nausea and vomiting. Genitourinary: Positive for difficulty urinating and dysuria. Negative for penile pain and testicular pain. Musculoskeletal: Negative. Negative for back pain and neck stiffness. Skin: Negative for rash. Neurological: Negative. Negative for syncope and headaches. Psychiatric/Behavioral: Negative for confusion. All other systems reviewed and are negative. Vitals:  
 01/31/19 1924 BP: 149/86 Pulse: 80 Resp: 18 Temp: 98.6 °F (37 °C) SpO2: 99% Weight: 80.7 kg (178 lb) Height: 5' 6\" (1.676 m) Physical Exam  
Constitutional: He is oriented to person, place, and time. He appears well-developed and well-nourished. No distress. HENT:  
Head: Normocephalic and atraumatic. Mouth/Throat: Oropharynx is clear and moist. No oropharyngeal exudate. Eyes: Conjunctivae and EOM are normal. Pupils are equal, round, and reactive to light. Right eye exhibits no discharge. Left eye exhibits no discharge. Neck: Normal range of motion. Neck supple. Cardiovascular: Normal rate, regular rhythm and intact distal pulses. Exam reveals no gallop and no friction rub. No murmur heard. Pulmonary/Chest: Effort normal and breath sounds normal. No respiratory distress. He has no wheezes. He has no rales. He exhibits no tenderness. Abdominal: Soft. Bowel sounds are normal. He exhibits no distension. There is no tenderness. There is no guarding. Abdomen soft and non tender. Genitourinary:  
Genitourinary Comments: Moran cath in place. Urine filling up the bag. No blood noted around urethral meatus. Musculoskeletal: Normal range of motion. He exhibits no edema. Lymphadenopathy:  
  He has no cervical adenopathy. Neurological: He is alert and oriented to person, place, and time. No cranial nerve deficit. Coordination normal.  
Skin: Skin is warm and dry. No rash noted. No erythema. Psychiatric: He has a normal mood and affect. Nursing note and vitals reviewed. MDM Number of Diagnoses or Management Options Difficulty urinating:  
Penile pain:  
Diagnosis management comments: CT revealed moran balloon in incorrect locations. Deflated balloon and reinserted. Pt reports relief of symptoms. Discharged patient home with moran cath to leg bag. Pt instructed to follow up with his urologist for further evaluation of symptoms. Reviewed treatment plan with attending and they agree. Mere Patrick PA-C Procedures PROGRESS NOTE 
8:31 PM  
Per nursing staff, pt is requesting to for a different bed because he is uncomfortable. Nursing staff gave the pt extra blankets and pillows, explained he can not have a hospital bed unless he is admitted. Pt current requesting to leave. Discharge: 
9:38 PM 
Patient's results have been reviewed with them. Patient and/or family have verbally conveyed their understanding and agreement of the patient's signs, symptoms, diagnosis, treatment and prognosis and additionally agree to follow up as recommended or return to the Emergency Room should their condition change prior to follow-up. Discharge instructions have also been provided to the patient with some educational information regarding their diagnosis as well a list of reasons why they would want to return to the ER prior to their follow-up appointment should their condition change.

## 2019-02-01 NOTE — ED TRIAGE NOTES
Triage: Had a penile implant placed had trouble urinating post-op catheter was placed removed today and hasn't been able to urinate since 1130.

## 2019-02-01 NOTE — DISCHARGE INSTRUCTIONS
Patient Education        Painful Urination (Dysuria): Care Instructions  Your Care Instructions  Burning pain with urination (dysuria) is a common symptom of a urinary tract infection or other urinary problems. The bladder may become inflamed. This can cause pain when the bladder fills and empties. You may also feel pain if the tube that carries urine from the bladder to the outside of the body (urethra) gets irritated or infected. Sexually transmitted infections (STIs) also may cause pain when you urinate. Sometimes the pain can be caused by things other than an infection. The urethra can be irritated by soaps, perfumes, or foreign objects in the urethra. Kidney stones can cause pain when they pass through the urethra. The cause may be hard to find. You may need tests. Treatment for painful urination depends on the cause. Follow-up care is a key part of your treatment and safety. Be sure to make and go to all appointments, and call your doctor if you are having problems. It's also a good idea to know your test results and keep a list of the medicines you take. How can you care for yourself at home? · Drink extra water for the next day or two. This will help make the urine less concentrated. (If you have kidney, heart, or liver disease and have to limit fluids, talk with your doctor before you increase the amount of fluids you drink.)  · Avoid drinks that are carbonated or have caffeine. They can irritate the bladder. · Urinate often. Try to empty your bladder each time. For women:  · Urinate right after you have sex. · After going to the bathroom, wipe from front to back. · Avoid douches, bubble baths, and feminine hygiene sprays. And avoid other feminine hygiene products that have deodorants. When should you call for help? Call your doctor now or seek immediate medical care if:    · You have new symptoms, such as fever, nausea, or vomiting.     · You have new or worse symptoms of a urinary problem.  For example:  ? You have blood or pus in your urine. ? You have chills or body aches. ? It hurts worse to urinate. ? You have groin or belly pain. ? You have pain in your back just below your rib cage (the flank area).    Watch closely for changes in your health, and be sure to contact your doctor if you have any problems. Where can you learn more? Go to http://jailene-paula.info/. Enter D171 in the search box to learn more about \"Painful Urination (Dysuria): Care Instructions. \"  Current as of: March 20, 2018  Content Version: 11.9  © 0615-2525 [employer]. Care instructions adapted under license by [employer] (which disclaims liability or warranty for this information). If you have questions about a medical condition or this instruction, always ask your healthcare professional. [place] [address] any warranty or liability for your use of this information. We hope that we have addressed all of your medical concerns. The examination and treatment you received in the Emergency Department were for an emergent problem and were not intended as complete care. It is important that you follow up with your healthcare provider(s) for ongoing care. If your symptoms worsen or do not improve as expected, and you are unable to reach your usual health care provider(s), you should return to the Emergency Department. Today's healthcare is undergoing tremendous change, and patient satisfaction surveys are one of the many tools to assess the quality of medical care. You may receive a survey from the [employer] organization regarding your experience in the Emergency Department. I hope that your experience has been completely positive, particularly the medical care that I provided. As such, please participate in the survey; anything less than excellent does not meet my expectations or intentions.         [address] [facility] participate in nationally recognized quality of care measures. If your blood pressure is greater than 120/80, as reported below, we urge that you seek medical care to address the potential of high blood pressure, commonly known as hypertension. Hypertension can be hereditary or can be caused by certain medical conditions, pain, stress, or \"white coat syndrome. \"       Please make an appointment with your health care provider(s) for follow up of your Emergency Department visit. VITALS:   Patient Vitals for the past 8 hrs:   Temp Pulse Resp BP SpO2   01/31/19 2000 -- -- -- 160/85 98 %   01/31/19 1945 -- -- -- (!) 147/97 99 %   01/31/19 1940 -- -- -- 137/74 98 %   01/31/19 1924 98.6 °F (37 °C) 80 18 149/86 99 %          Thank you for allowing us to provide you with medical care today. We realize that you have many choices for your emergency care needs. Please choose us in the future for any continued health care needs. Brain Rhianna Sud[place], [address] [facility] Hospital[facility] Hospital[facility]: [phone]            No results found for this or any previous visit (from the past 24 hour(s)). Ct Abd Pelv Wo Cont    Result Date: 1/31/2019  EXAM: CT ABD PELV WO CONT INDICATION: groin pain COMPARISON: 1/30/2019 CONTRAST:  None. TECHNIQUE: Thin axial images were obtained through the abdomen and pelvis. Coronal and sagittal reconstructions were generated. Oral contrast was not administered. CT dose reduction was achieved through use of a standardized protocol tailored for this examination and automatic exposure control for dose modulation. The absence of intravenous contrast material reduces the sensitivity for evaluation of the solid parenchymal organs of the abdomen. FINDINGS: LUNG BASES: Clear. INCIDENTALLY IMAGED HEART AND MEDIASTINUM: Unremarkable. LIVER: No mass or biliary dilatation. GALLBLADDER: Unremarkable. SPLEEN: No mass. PANCREAS: No mass or ductal dilatation. ADRENALS: Unremarkable. KIDNEYS/URETERS: No mass, calculus, or hydronephrosis. STOMACH: Unremarkable. SMALL BOWEL: No dilatation or wall thickening. COLON: No dilatation or wall thickening. APPENDIX: Unremarkable. PERITONEUM: No ascites or pneumoperitoneum. RETROPERITONEUM: No lymphadenopathy or aortic aneurysm. REPRODUCTIVE ORGANS: URINARY BLADDER: Penile implants are unchanged in position. Wall of the urinary bladder slightly thickened. There is a bubble of gas in urinary bladder. Calcifications in the urinary bladder because are unchanged. Fitzpatrick catheter balloon is in the membranous urethra with the distal part of the catheter extending into the prostatic urethra into the urinary bladder. There is nonspecific edema anterior to the symphysis pubis in the soft tissues. BONES: No destructive bone lesion. ADDITIONAL COMMENTS: Small amount of free fluid in the cul-de-sac. IMPRESSION: 1. The Fitzpatrick catheter balloon is in the membranous urethra with the distal part of the catheter in the prostatic urethra. Urinary bladder wall is mildly thickened and calcifications are unchanged. Penile implants and balloons are unchanged. Results called to the ER.

## 2019-02-01 NOTE — ED NOTES
MOISES Glez  gave and reviewed discharge instructions with the patient and spouse. The patient and spouse verbalized understanding. The patient and spouse was given opportunity for questions. Patient discharged in stable condition to the waiting room via ambulatory with family.

## (undated) DEVICE — SUTURE MCRYL SZ 2-0 L27IN ABSRB UD SH L26MM TAPERPOINT NDL Y417H

## (undated) DEVICE — Device

## (undated) DEVICE — DEVICE ERECTILE RESTR FOR PENILE PROS

## (undated) DEVICE — CATH URETH FOL 2W SH 12FRX5ML -- CONVERT TO ITEM 363070

## (undated) DEVICE — CLIP LIG ADH PD W SLOT HORZ --

## (undated) DEVICE — SPONGE: SPECIALTY PEANUT XR 100/CS: Brand: MEDICAL ACTION INDUSTRIES

## (undated) DEVICE — SYRINGE MED 20ML STD CLR PLAS LUERLOCK TIP N CTRL DISP

## (undated) DEVICE — CATH URETH FOL 2W MED 12FRX5 --

## (undated) DEVICE — UNDER BUTTOCKS DRAPE: Brand: CONVERTORS

## (undated) DEVICE — BLADE ASSEMB CLP HAIR FINE --

## (undated) DEVICE — REM POLYHESIVE ADULT PATIENT RETURN ELECTRODE: Brand: VALLEYLAB

## (undated) DEVICE — SYR 50ML LR LCK 1ML GRAD NSAF --

## (undated) DEVICE — INTENDED FOR TISSUE SEPARATION, AND OTHER PROCEDURES THAT REQUIRE A SHARP SURGICAL BLADE TO PUNCTURE OR CUT.: Brand: BARD-PARKER ® CARBON RIB-BACK BLADES

## (undated) DEVICE — SUTURE VCRL SZ 2-0 L27IN ABSRB VLT L26MM UR-6 5/8 CIR J602H

## (undated) DEVICE — PACK,BASIC,SIRUS,V: Brand: MEDLINE

## (undated) DEVICE — 1200 GUARD II KIT W/5MM TUBE W/O VAC TUBE: Brand: GUARDIAN

## (undated) DEVICE — SUTURE PDS II SZ 3-0 L27IN ABSRB CLR SH L26MM 1/2 CIR TAPR Z416H

## (undated) DEVICE — DBD-PACK,LAPAROTOMY,2 REINFORCED GOWNS: Brand: MEDLINE

## (undated) DEVICE — STERILE POLYISOPRENE POWDER-FREE SURGICAL GLOVES WITH EMOLLIENT COATING: Brand: PROTEXIS

## (undated) DEVICE — JELLY,LUBE,STERILE,FLIP TOP,TUBE,4-OZ: Brand: MEDLINE

## (undated) DEVICE — SYR LR LCK 1ML GRAD NSAF 30ML --

## (undated) DEVICE — 3M™ MEDIPORE™ H SOFT CLOTH SURGICAL TAPE 2862, 2 INCH X 10 YARD (5CM X 9,1M), 12 ROLLS/CASE: Brand: 3M™ MEDIPORE™

## (undated) DEVICE — TOWEL SURG W17XL27IN STD BLU COT NONFENESTRATED PREWASHED

## (undated) DEVICE — SUTURE PROL SZ 0 L30IN NONABSORBABLE BLU L26MM CT-2 1/2 CIR 8412H

## (undated) DEVICE — KENDALL SCD EXPRESS SLEEVES, KNEE LENGTH, MEDIUM: Brand: KENDALL SCD

## (undated) DEVICE — ROCKER SWITCH PENCIL BLADE ELECTRODE, HOLSTER: Brand: EDGE

## (undated) DEVICE — SURGICAL PROCEDURE PACK BASIN MAJ SET CUST NO CAUT

## (undated) DEVICE — (D)STRIP SKN CLSR 0.5X4IN WHT --

## (undated) DEVICE — HANDLE LT SNAP ON ULT DURABLE LENS FOR TRUMPF ALC DISPOSABLE

## (undated) DEVICE — SYRINGE CATH TIP 50ML

## (undated) DEVICE — GOWN,SIRUS,NONRNF,SETINSLV,XL,20/CS: Brand: MEDLINE

## (undated) DEVICE — SUTURE VCRL SZ 2-0 L18IN ABSRB VLT L26MM CT-2 1/2 CIR J726D

## (undated) DEVICE — OCCLUSIVE GAUZE STRIP,3% BISMUTH TRIBROMOPHENATE IN PETROLATUM BLEND: Brand: XEROFORM

## (undated) DEVICE — APPLICATOR BNDG 1MM ADH PREMIERPRO EXOFIN

## (undated) DEVICE — FLOSEAL HEMOSTATIC MATRIX, 10 ML: Brand: FLOSEAL

## (undated) DEVICE — SOLUTION IRRIG 1000ML H2O STRL BLT

## (undated) DEVICE — GRADUATED BOWL: Brand: DEVON

## (undated) DEVICE — STERILE POLYISOPRENE POWDER-FREE SURGICAL GLOVES: Brand: PROTEXIS

## (undated) DEVICE — TRAY PREP DRY W/ PREM GLV 2 APPL 6 SPNG 2 UNDPD 1 OVERWRAP

## (undated) DEVICE — 3M™ STERI-DRAPE™ U-DRAPE 1015: Brand: STERI-DRAPE™

## (undated) DEVICE — SUTURE VCRL SZ 3-0 L27IN ABSRB UD L26MM SH 1/2 CIR J416H

## (undated) DEVICE — SYR 10ML LUER LOK 1/5ML GRAD --

## (undated) DEVICE — DEVON™ KNEE AND BODY STRAP 60" X 3" (1.5 M X 7.6 CM): Brand: DEVON

## (undated) DEVICE — X-RAY SPONGES,16 PLY: Brand: DERMACEA

## (undated) DEVICE — LEGGINGS, PAIR, 31X48, STERILE: Brand: MEDLINE

## (undated) DEVICE — BAG DRAIN URIN 2000ML LF STRL -- CONVERT TO ITEM 363123

## (undated) DEVICE — 1/4 IN. X 18 IN. LENGTH: Brand: SILICONE TUBING, PENROSE DRAIN

## (undated) DEVICE — INFECTION CONTROL KIT SYS

## (undated) DEVICE — TELFA NON-ADHERENT ABSORBENT DRESSING: Brand: TELFA

## (undated) DEVICE — SOLUTION IV 1000ML 0.9% SOD CHL

## (undated) DEVICE — CLIP LIG ADH PD HORZ MED BLU --

## (undated) DEVICE — DRAPE SURG CYSTO N REINF ST W O FLD PCH STD

## (undated) DEVICE — TRAY CATH 16F DRN BG LTX -- CONVERT TO ITEM 363158

## (undated) DEVICE — KERLIX BANDAGE ROLL: Brand: KERLIX

## (undated) DEVICE — STRAP RESTRAIN W3.5XL19IN TECLIN STRRP POS LEG DURING LITH

## (undated) DEVICE — SUTURE MCRYL SZ 3-0 L27IN ABSRB UD L19MM PS-2 3/8 CIR PRIM Y427H

## (undated) DEVICE — Z DISCONTINUED USE 2425483 (LOW STOCK PER MEDLINE) TAPE UMB L18IN DIA1/8IN WHT COT NONABSORBABLE W/O NDL FOR